# Patient Record
Sex: FEMALE | Race: WHITE | NOT HISPANIC OR LATINO | Employment: OTHER | RURAL
[De-identification: names, ages, dates, MRNs, and addresses within clinical notes are randomized per-mention and may not be internally consistent; named-entity substitution may affect disease eponyms.]

---

## 2020-09-08 ENCOUNTER — HISTORICAL (OUTPATIENT)
Dept: ADMINISTRATIVE | Facility: HOSPITAL | Age: 65
End: 2020-09-08

## 2020-11-05 ENCOUNTER — HISTORICAL (OUTPATIENT)
Dept: ADMINISTRATIVE | Facility: HOSPITAL | Age: 65
End: 2020-11-05

## 2020-11-23 ENCOUNTER — HISTORICAL (OUTPATIENT)
Dept: ADMINISTRATIVE | Facility: HOSPITAL | Age: 65
End: 2020-11-23

## 2020-12-17 ENCOUNTER — HISTORICAL (OUTPATIENT)
Dept: ADMINISTRATIVE | Facility: HOSPITAL | Age: 65
End: 2020-12-17

## 2021-06-30 ENCOUNTER — HOSPITAL ENCOUNTER (OUTPATIENT)
Dept: RADIOLOGY | Facility: HOSPITAL | Age: 66
Discharge: HOME | End: 2021-06-30
Payer: COMMERCIAL

## 2021-06-30 VITALS — HEIGHT: 64 IN | WEIGHT: 138 LBS | BODY MASS INDEX: 23.56 KG/M2

## 2021-06-30 DIAGNOSIS — Z12.31 VISIT FOR SCREENING MAMMOGRAM: ICD-10-CM

## 2021-06-30 PROCEDURE — 77067 SCR MAMMO BI INCL CAD: CPT | Mod: 26,,, | Performed by: RADIOLOGY

## 2021-06-30 PROCEDURE — 77067 MAMMO DIGITAL SCREENING BILAT: ICD-10-PCS | Mod: 26,,, | Performed by: RADIOLOGY

## 2021-06-30 PROCEDURE — 77067 SCR MAMMO BI INCL CAD: CPT | Mod: TC

## 2021-09-29 RX ORDER — ZOLPIDEM TARTRATE 10 MG/1
TABLET ORAL
Qty: 30 TABLET | Refills: 2 | Status: SHIPPED | OUTPATIENT
Start: 2021-09-29 | End: 2021-09-29 | Stop reason: SDUPTHER

## 2021-09-29 RX ORDER — ZOLPIDEM TARTRATE 10 MG/1
10 TABLET ORAL NIGHTLY PRN
Qty: 30 TABLET | Refills: 0 | Status: SHIPPED | OUTPATIENT
Start: 2021-09-29 | End: 2021-10-26 | Stop reason: SDUPTHER

## 2021-10-26 ENCOUNTER — OFFICE VISIT (OUTPATIENT)
Dept: FAMILY MEDICINE | Facility: CLINIC | Age: 66
End: 2021-10-26
Payer: COMMERCIAL

## 2021-10-26 ENCOUNTER — APPOINTMENT (OUTPATIENT)
Dept: RADIOLOGY | Facility: CLINIC | Age: 66
End: 2021-10-26
Attending: FAMILY MEDICINE
Payer: COMMERCIAL

## 2021-10-26 VITALS
OXYGEN SATURATION: 98 % | HEIGHT: 64 IN | SYSTOLIC BLOOD PRESSURE: 98 MMHG | BODY MASS INDEX: 23.7 KG/M2 | DIASTOLIC BLOOD PRESSURE: 53 MMHG | WEIGHT: 138.81 LBS | TEMPERATURE: 99 F | HEART RATE: 87 BPM

## 2021-10-26 DIAGNOSIS — M54.50 LOW BACK PAIN, UNSPECIFIED BACK PAIN LATERALITY, UNSPECIFIED CHRONICITY, UNSPECIFIED WHETHER SCIATICA PRESENT: ICD-10-CM

## 2021-10-26 DIAGNOSIS — F41.1 GAD (GENERALIZED ANXIETY DISORDER): ICD-10-CM

## 2021-10-26 DIAGNOSIS — E03.9 HYPOTHYROIDISM, UNSPECIFIED TYPE: ICD-10-CM

## 2021-10-26 DIAGNOSIS — I10 HYPERTENSION, UNSPECIFIED TYPE: ICD-10-CM

## 2021-10-26 DIAGNOSIS — M54.50 LOW BACK PAIN, UNSPECIFIED BACK PAIN LATERALITY, UNSPECIFIED CHRONICITY, UNSPECIFIED WHETHER SCIATICA PRESENT: Primary | ICD-10-CM

## 2021-10-26 LAB
ALBUMIN SERPL BCP-MCNC: 3.4 G/DL (ref 3.5–5)
ALBUMIN/GLOB SERPL: 0.8 {RATIO}
ALP SERPL-CCNC: 153 U/L (ref 50–130)
ALT SERPL W P-5'-P-CCNC: 20 U/L (ref 13–56)
ANION GAP SERPL CALCULATED.3IONS-SCNC: 11 MMOL/L (ref 7–16)
AST SERPL W P-5'-P-CCNC: 16 U/L (ref 15–37)
BASOPHILS # BLD AUTO: 0.07 K/UL (ref 0–0.2)
BASOPHILS NFR BLD AUTO: 0.6 % (ref 0–1)
BASOPHILS NFR BLD MANUAL: 1 % (ref 0–1)
BILIRUB SERPL-MCNC: 0.5 MG/DL (ref 0–1.2)
BUN SERPL-MCNC: 14 MG/DL (ref 7–18)
BUN/CREAT SERPL: 12 (ref 6–20)
CALCIUM SERPL-MCNC: 9.1 MG/DL (ref 8.5–10.1)
CHLORIDE SERPL-SCNC: 96 MMOL/L (ref 98–107)
CHOLEST SERPL-MCNC: 160 MG/DL (ref 0–200)
CHOLEST/HDLC SERPL: 2.1 {RATIO}
CO2 SERPL-SCNC: 31 MMOL/L (ref 21–32)
CREAT SERPL-MCNC: 1.21 MG/DL (ref 0.55–1.02)
DIFFERENTIAL METHOD BLD: ABNORMAL
EOSINOPHIL # BLD AUTO: 0.04 K/UL (ref 0–0.5)
EOSINOPHIL NFR BLD AUTO: 0.3 % (ref 1–4)
ERYTHROCYTE [DISTWIDTH] IN BLOOD BY AUTOMATED COUNT: 15.3 % (ref 11.5–14.5)
GLOBULIN SER-MCNC: 4.3 G/DL (ref 2–4)
GLUCOSE SERPL-MCNC: 80 MG/DL (ref 74–106)
HCT VFR BLD AUTO: 35.5 % (ref 38–47)
HDLC SERPL-MCNC: 77 MG/DL (ref 40–60)
HGB BLD-MCNC: 11.5 G/DL (ref 12–16)
IMM GRANULOCYTES # BLD AUTO: 0.04 K/UL (ref 0–0.04)
IMM GRANULOCYTES NFR BLD: 0.3 % (ref 0–0.4)
LDLC SERPL CALC-MCNC: 65 MG/DL
LDLC/HDLC SERPL: 0.8 {RATIO}
LYMPHOCYTES # BLD AUTO: 3.46 K/UL (ref 1–4.8)
LYMPHOCYTES NFR BLD AUTO: 29.7 % (ref 27–41)
LYMPHOCYTES NFR BLD MANUAL: 34 % (ref 27–41)
MCH RBC QN AUTO: 29.6 PG (ref 27–31)
MCHC RBC AUTO-ENTMCNC: 32.4 G/DL (ref 32–36)
MCV RBC AUTO: 91.5 FL (ref 80–96)
MONOCYTES # BLD AUTO: 1.42 K/UL (ref 0–0.8)
MONOCYTES NFR BLD AUTO: 12.2 % (ref 2–6)
MONOCYTES NFR BLD MANUAL: 16 % (ref 2–6)
MPC BLD CALC-MCNC: 10.4 FL (ref 9.4–12.4)
NEUTROPHILS # BLD AUTO: 6.61 K/UL (ref 1.8–7.7)
NEUTROPHILS NFR BLD AUTO: 56.9 % (ref 53–65)
NEUTS SEG NFR BLD MANUAL: 49 % (ref 50–62)
NONHDLC SERPL-MCNC: 83 MG/DL
NRBC # BLD AUTO: 0 X10E3/UL
NRBC, AUTO (.00): 0 %
PLATELET # BLD AUTO: 392 K/UL (ref 150–400)
PLATELET MORPHOLOGY: NORMAL
POTASSIUM SERPL-SCNC: 3.2 MMOL/L (ref 3.5–5.1)
PROT SERPL-MCNC: 7.7 G/DL (ref 6.4–8.2)
RBC # BLD AUTO: 3.88 M/UL (ref 4.2–5.4)
RBC MORPH BLD: NORMAL
SODIUM SERPL-SCNC: 135 MMOL/L (ref 136–145)
T4 FREE SERPL-MCNC: 0.93 NG/DL (ref 0.76–1.46)
TRIGL SERPL-MCNC: 91 MG/DL (ref 35–150)
TSH SERPL DL<=0.005 MIU/L-ACNC: 3.32 UIU/ML (ref 0.36–3.74)
VLDLC SERPL-MCNC: 18 MG/DL
WBC # BLD AUTO: 11.64 K/UL (ref 4.5–11)

## 2021-10-26 PROCEDURE — 72100 PR  X-RAY LUMBAR SPINE 2/3 VW: ICD-10-PCS | Mod: TC,,, | Performed by: FAMILY MEDICINE

## 2021-10-26 PROCEDURE — 80061 LIPID PANEL: ICD-10-PCS | Mod: ,,, | Performed by: CLINICAL MEDICAL LABORATORY

## 2021-10-26 PROCEDURE — 96372 THER/PROPH/DIAG INJ SC/IM: CPT | Mod: ,,, | Performed by: FAMILY MEDICINE

## 2021-10-26 PROCEDURE — 99214 PR OFFICE/OUTPT VISIT, EST, LEVL IV, 30-39 MIN: ICD-10-PCS | Mod: 25,,, | Performed by: FAMILY MEDICINE

## 2021-10-26 PROCEDURE — 84439 ASSAY OF FREE THYROXINE: CPT | Mod: ,,, | Performed by: CLINICAL MEDICAL LABORATORY

## 2021-10-26 PROCEDURE — 84439 T4, FREE: ICD-10-PCS | Mod: ,,, | Performed by: CLINICAL MEDICAL LABORATORY

## 2021-10-26 PROCEDURE — 84443 ASSAY THYROID STIM HORMONE: CPT | Mod: ,,, | Performed by: CLINICAL MEDICAL LABORATORY

## 2021-10-26 PROCEDURE — 85025 CBC WITH DIFFERENTIAL: ICD-10-PCS | Mod: ,,, | Performed by: CLINICAL MEDICAL LABORATORY

## 2021-10-26 PROCEDURE — 72100 X-RAY EXAM L-S SPINE 2/3 VWS: CPT | Mod: TC,,, | Performed by: FAMILY MEDICINE

## 2021-10-26 PROCEDURE — 72100 X-RAY EXAM L-S SPINE 2/3 VWS: CPT | Mod: 26,,, | Performed by: RADIOLOGY

## 2021-10-26 PROCEDURE — 84443 TSH: ICD-10-PCS | Mod: ,,, | Performed by: CLINICAL MEDICAL LABORATORY

## 2021-10-26 PROCEDURE — 85025 COMPLETE CBC W/AUTO DIFF WBC: CPT | Mod: ,,, | Performed by: CLINICAL MEDICAL LABORATORY

## 2021-10-26 PROCEDURE — 80053 COMPREHEN METABOLIC PANEL: CPT | Mod: ,,, | Performed by: CLINICAL MEDICAL LABORATORY

## 2021-10-26 PROCEDURE — 72100 X-RAY EXAM L-S SPINE 2/3 VWS: CPT | Mod: TC,RHCUB,FY | Performed by: FAMILY MEDICINE

## 2021-10-26 PROCEDURE — 80061 LIPID PANEL: CPT | Mod: ,,, | Performed by: CLINICAL MEDICAL LABORATORY

## 2021-10-26 PROCEDURE — 72100 XR LUMBAR SPINE AP AND LATERAL: ICD-10-PCS | Mod: 26,,, | Performed by: RADIOLOGY

## 2021-10-26 PROCEDURE — 96372 PR INJECTION,THERAP/PROPH/DIAG2ST, IM OR SUBCUT: ICD-10-PCS | Mod: ,,, | Performed by: FAMILY MEDICINE

## 2021-10-26 PROCEDURE — 80053 COMPREHENSIVE METABOLIC PANEL: ICD-10-PCS | Mod: ,,, | Performed by: CLINICAL MEDICAL LABORATORY

## 2021-10-26 PROCEDURE — 99214 OFFICE O/P EST MOD 30 MIN: CPT | Mod: 25,,, | Performed by: FAMILY MEDICINE

## 2021-10-26 RX ORDER — CLOPIDOGREL BISULFATE 75 MG/1
75 TABLET ORAL DAILY
COMMUNITY
Start: 2021-09-29 | End: 2022-12-21 | Stop reason: ALTCHOICE

## 2021-10-26 RX ORDER — KETOROLAC TROMETHAMINE 30 MG/ML
30 INJECTION, SOLUTION INTRAMUSCULAR; INTRAVENOUS
Status: COMPLETED | OUTPATIENT
Start: 2021-10-26 | End: 2021-10-26

## 2021-10-26 RX ORDER — NITROGLYCERIN 0.4 MG/1
0.4 TABLET SUBLINGUAL EVERY 5 MIN PRN
COMMUNITY

## 2021-10-26 RX ORDER — METHOCARBAMOL 750 MG/1
750 TABLET, FILM COATED ORAL 3 TIMES DAILY PRN
COMMUNITY
Start: 2021-09-29

## 2021-10-26 RX ORDER — PAROXETINE HYDROCHLORIDE 40 MG/1
40 TABLET, FILM COATED ORAL EVERY MORNING
COMMUNITY
Start: 2021-09-29 | End: 2021-10-26

## 2021-10-26 RX ORDER — BUSPIRONE HYDROCHLORIDE 10 MG/1
10 TABLET ORAL 2 TIMES DAILY
Qty: 60 TABLET | Refills: 11 | Status: SHIPPED | OUTPATIENT
Start: 2021-10-26 | End: 2022-10-24

## 2021-10-26 RX ORDER — AMITRIPTYLINE HYDROCHLORIDE 100 MG/1
100 TABLET ORAL NIGHTLY
COMMUNITY
Start: 2021-10-11 | End: 2023-04-06

## 2021-10-26 RX ORDER — PROMETHAZINE HYDROCHLORIDE 25 MG/1
TABLET ORAL
COMMUNITY
Start: 2021-10-11

## 2021-10-26 RX ORDER — ZOLPIDEM TARTRATE 10 MG/1
10 TABLET ORAL NIGHTLY PRN
Qty: 30 TABLET | Refills: 2 | Status: SHIPPED | OUTPATIENT
Start: 2021-10-26 | End: 2022-05-23 | Stop reason: SDUPTHER

## 2021-10-26 RX ORDER — ASPIRIN 81 MG/1
81 TABLET ORAL DAILY
COMMUNITY

## 2021-10-26 RX ORDER — POTASSIUM CHLORIDE 20 MEQ/1
1 TABLET, EXTENDED RELEASE ORAL 2 TIMES DAILY
COMMUNITY
Start: 2021-10-13

## 2021-10-26 RX ORDER — LEVOTHYROXINE SODIUM 50 UG/1
50 TABLET ORAL EVERY MORNING
COMMUNITY
Start: 2021-09-29 | End: 2021-10-26 | Stop reason: SDUPTHER

## 2021-10-26 RX ORDER — MULTIVITAMIN
1 TABLET ORAL DAILY
COMMUNITY

## 2021-10-26 RX ORDER — LANOLIN ALCOHOL/MO/W.PET/CERES
1 CREAM (GRAM) TOPICAL 2 TIMES DAILY
COMMUNITY
End: 2022-12-08 | Stop reason: SDUPTHER

## 2021-10-26 RX ORDER — DULOXETIN HYDROCHLORIDE 60 MG/1
60 CAPSULE, DELAYED RELEASE ORAL 2 TIMES DAILY
COMMUNITY
Start: 2021-10-21 | End: 2023-04-06

## 2021-10-26 RX ORDER — GABAPENTIN 300 MG/1
300 CAPSULE ORAL 3 TIMES DAILY
COMMUNITY
Start: 2021-10-18

## 2021-10-26 RX ORDER — ISOSORBIDE MONONITRATE 60 MG/1
60 TABLET, EXTENDED RELEASE ORAL 2 TIMES DAILY
COMMUNITY
Start: 2021-09-29

## 2021-10-26 RX ORDER — TOPIRAMATE 100 MG/1
1000 TABLET, FILM COATED ORAL NIGHTLY
COMMUNITY
Start: 2021-10-11

## 2021-10-26 RX ORDER — METOLAZONE 2.5 MG/1
2.5 TABLET ORAL DAILY PRN
COMMUNITY

## 2021-10-26 RX ORDER — LEVOTHYROXINE SODIUM 50 UG/1
50 TABLET ORAL EVERY MORNING
Qty: 90 TABLET | Refills: 1 | Status: SHIPPED | OUTPATIENT
Start: 2021-10-26 | End: 2022-05-23 | Stop reason: SDUPTHER

## 2021-10-26 RX ORDER — HYDROCODONE BITARTRATE AND ACETAMINOPHEN 10; 325 MG/1; MG/1
TABLET ORAL
COMMUNITY
Start: 2021-09-29

## 2021-10-26 RX ORDER — FUROSEMIDE 80 MG/1
80 TABLET ORAL 2 TIMES DAILY
COMMUNITY
Start: 2021-10-15

## 2021-10-26 RX ORDER — DILTIAZEM HYDROCHLORIDE 120 MG/1
CAPSULE, COATED, EXTENDED RELEASE ORAL
COMMUNITY
Start: 2021-10-15 | End: 2023-04-06

## 2021-10-26 RX ORDER — BUTALBITAL AND ACETAMINOPHEN 325; 50 MG/1; MG/1
TABLET ORAL
COMMUNITY
Start: 2021-09-29

## 2021-10-26 RX ORDER — HYDRALAZINE HYDROCHLORIDE 10 MG/1
10 TABLET, FILM COATED ORAL 3 TIMES DAILY
COMMUNITY
Start: 2021-09-17 | End: 2023-04-06

## 2021-10-26 RX ADMIN — KETOROLAC TROMETHAMINE 30 MG: 30 INJECTION, SOLUTION INTRAMUSCULAR; INTRAVENOUS at 10:10

## 2021-11-04 ENCOUNTER — PATIENT OUTREACH (OUTPATIENT)
Dept: FAMILY MEDICINE | Facility: CLINIC | Age: 66
End: 2021-11-04
Payer: COMMERCIAL

## 2021-11-18 ENCOUNTER — OFFICE VISIT (OUTPATIENT)
Dept: FAMILY MEDICINE | Facility: CLINIC | Age: 66
End: 2021-11-18
Payer: COMMERCIAL

## 2021-11-18 VITALS
SYSTOLIC BLOOD PRESSURE: 109 MMHG | TEMPERATURE: 97 F | BODY MASS INDEX: 23.56 KG/M2 | HEART RATE: 97 BPM | OXYGEN SATURATION: 98 % | DIASTOLIC BLOOD PRESSURE: 56 MMHG | HEIGHT: 64 IN | WEIGHT: 138 LBS

## 2021-11-18 DIAGNOSIS — J01.00 ACUTE NON-RECURRENT MAXILLARY SINUSITIS: Primary | ICD-10-CM

## 2021-11-18 DIAGNOSIS — R05.9 COUGH: ICD-10-CM

## 2021-11-18 PROCEDURE — 99213 PR OFFICE/OUTPT VISIT, EST, LEVL III, 20-29 MIN: ICD-10-PCS | Mod: 25,,, | Performed by: FAMILY MEDICINE

## 2021-11-18 PROCEDURE — 96372 THER/PROPH/DIAG INJ SC/IM: CPT | Mod: ,,, | Performed by: FAMILY MEDICINE

## 2021-11-18 PROCEDURE — 96372 PR INJECTION,THERAP/PROPH/DIAG2ST, IM OR SUBCUT: ICD-10-PCS | Mod: ,,, | Performed by: FAMILY MEDICINE

## 2021-11-18 PROCEDURE — 99213 OFFICE O/P EST LOW 20 MIN: CPT | Mod: 25,,, | Performed by: FAMILY MEDICINE

## 2021-11-18 RX ORDER — CEFTRIAXONE 1 G/1
1 INJECTION, POWDER, FOR SOLUTION INTRAMUSCULAR; INTRAVENOUS
Status: COMPLETED | OUTPATIENT
Start: 2021-11-18 | End: 2021-11-18

## 2021-11-18 RX ORDER — PAROXETINE HYDROCHLORIDE 40 MG/1
40 TABLET, FILM COATED ORAL EVERY MORNING
COMMUNITY
Start: 2021-11-04 | End: 2022-01-05 | Stop reason: SDUPTHER

## 2021-11-18 RX ORDER — DEXAMETHASONE SODIUM PHOSPHATE 4 MG/ML
2 INJECTION, SOLUTION INTRA-ARTICULAR; INTRALESIONAL; INTRAMUSCULAR; INTRAVENOUS; SOFT TISSUE
Status: COMPLETED | OUTPATIENT
Start: 2021-11-18 | End: 2021-11-18

## 2021-11-18 RX ORDER — METHYLPREDNISOLONE ACETATE 80 MG/ML
20 INJECTION, SUSPENSION INTRA-ARTICULAR; INTRALESIONAL; INTRAMUSCULAR; SOFT TISSUE
Status: COMPLETED | OUTPATIENT
Start: 2021-11-18 | End: 2021-11-18

## 2021-11-18 RX ADMIN — DEXAMETHASONE SODIUM PHOSPHATE 2 MG: 4 INJECTION, SOLUTION INTRA-ARTICULAR; INTRALESIONAL; INTRAMUSCULAR; INTRAVENOUS; SOFT TISSUE at 03:11

## 2021-11-18 RX ADMIN — METHYLPREDNISOLONE ACETATE 20 MG: 80 INJECTION, SUSPENSION INTRA-ARTICULAR; INTRALESIONAL; INTRAMUSCULAR; SOFT TISSUE at 03:11

## 2021-11-18 RX ADMIN — CEFTRIAXONE 1 G: 1 INJECTION, POWDER, FOR SOLUTION INTRAMUSCULAR; INTRAVENOUS at 03:11

## 2022-01-05 RX ORDER — PAROXETINE HYDROCHLORIDE 40 MG/1
40 TABLET, FILM COATED ORAL EVERY MORNING
Qty: 90 TABLET | Refills: 1 | Status: SHIPPED | OUTPATIENT
Start: 2022-01-05 | End: 2022-06-07 | Stop reason: SDUPTHER

## 2022-01-05 NOTE — TELEPHONE ENCOUNTER
----- Message from Denise Valdez sent at 1/5/2022  3:17 PM CST -----  Contact: self  Requesting Paxil to [facility]  [phone]

## 2022-05-23 RX ORDER — LEVOTHYROXINE SODIUM 50 UG/1
50 TABLET ORAL EVERY MORNING
Qty: 30 TABLET | Refills: 0 | Status: SHIPPED | OUTPATIENT
Start: 2022-05-23 | End: 2022-06-07 | Stop reason: SDUPTHER

## 2022-05-23 RX ORDER — ZOLPIDEM TARTRATE 10 MG/1
10 TABLET ORAL NIGHTLY PRN
Qty: 30 TABLET | Refills: 0 | Status: SHIPPED | OUTPATIENT
Start: 2022-05-23 | End: 2022-06-07 | Stop reason: SDUPTHER

## 2022-05-23 NOTE — TELEPHONE ENCOUNTER
To schedule ck up  ----- Message from Pat Blake sent at 5/23/2022  2:25 PM CDT -----  Pt is requesting refills on Ambien and Levothyroxine to be sent to [facility]

## 2022-06-07 ENCOUNTER — OFFICE VISIT (OUTPATIENT)
Dept: FAMILY MEDICINE | Facility: CLINIC | Age: 67
End: 2022-06-07
Payer: MEDICARE

## 2022-06-07 VITALS
TEMPERATURE: 98 F | HEART RATE: 84 BPM | BODY MASS INDEX: 23.05 KG/M2 | SYSTOLIC BLOOD PRESSURE: 99 MMHG | OXYGEN SATURATION: 96 % | HEIGHT: 64 IN | DIASTOLIC BLOOD PRESSURE: 55 MMHG | WEIGHT: 135 LBS

## 2022-06-07 DIAGNOSIS — E03.9 HYPOTHYROIDISM, UNSPECIFIED TYPE: Primary | ICD-10-CM

## 2022-06-07 DIAGNOSIS — J01.01 ACUTE RECURRENT MAXILLARY SINUSITIS: ICD-10-CM

## 2022-06-07 LAB
ATYPICAL LYMPHOCYTES: ABNORMAL
BASOPHILS # BLD AUTO: 0.05 K/UL (ref 0–0.2)
BASOPHILS NFR BLD AUTO: 0.7 % (ref 0–1)
BASOPHILS NFR BLD MANUAL: 1 % (ref 0–1)
DIFFERENTIAL METHOD BLD: ABNORMAL
EOSINOPHIL # BLD AUTO: 0.15 K/UL (ref 0–0.5)
EOSINOPHIL NFR BLD AUTO: 2 % (ref 1–4)
EOSINOPHIL NFR BLD MANUAL: 4 % (ref 1–4)
ERYTHROCYTE [DISTWIDTH] IN BLOOD BY AUTOMATED COUNT: 13.5 % (ref 11.5–14.5)
HCT VFR BLD AUTO: 37.2 % (ref 38–47)
HGB BLD-MCNC: 12.4 G/DL (ref 12–16)
IMM GRANULOCYTES # BLD AUTO: 0.07 K/UL (ref 0–0.04)
IMM GRANULOCYTES NFR BLD: 0.9 % (ref 0–0.4)
LYMPHOCYTES # BLD AUTO: 2.11 K/UL (ref 1–4.8)
LYMPHOCYTES NFR BLD AUTO: 27.7 % (ref 27–41)
LYMPHOCYTES NFR BLD MANUAL: 34 % (ref 27–41)
MCH RBC QN AUTO: 31.1 PG (ref 27–31)
MCHC RBC AUTO-ENTMCNC: 33.3 G/DL (ref 32–36)
MCV RBC AUTO: 93.2 FL (ref 80–96)
MONOCYTES # BLD AUTO: 0.86 K/UL (ref 0–0.8)
MONOCYTES NFR BLD AUTO: 11.3 % (ref 2–6)
MONOCYTES NFR BLD MANUAL: 7 % (ref 2–6)
MPC BLD CALC-MCNC: 10.4 FL (ref 9.4–12.4)
NEUTROPHILS # BLD AUTO: 4.39 K/UL (ref 1.8–7.7)
NEUTROPHILS NFR BLD AUTO: 57.4 % (ref 53–65)
NEUTS BAND NFR BLD MANUAL: 1 % (ref 1–5)
NEUTS SEG NFR BLD MANUAL: 53 % (ref 50–62)
NRBC # BLD AUTO: 0 X10E3/UL
NRBC, AUTO (.00): 0 %
PLATELET # BLD AUTO: 389 K/UL (ref 150–400)
PLATELET MORPHOLOGY: NORMAL
RBC # BLD AUTO: 3.99 M/UL (ref 4.2–5.4)
RBC MORPH BLD: NORMAL
SMUDGE CELLS BLD QL SMEAR: ABNORMAL
WBC # BLD AUTO: 7.63 K/UL (ref 4.5–11)
WBC TOXIC VACUOLES BLD QL SMEAR: ABNORMAL

## 2022-06-07 PROCEDURE — 99214 OFFICE O/P EST MOD 30 MIN: CPT | Mod: 25,,, | Performed by: FAMILY MEDICINE

## 2022-06-07 PROCEDURE — 3078F PR MOST RECENT DIASTOLIC BLOOD PRESSURE < 80 MM HG: ICD-10-PCS | Mod: ,,, | Performed by: FAMILY MEDICINE

## 2022-06-07 PROCEDURE — 3288F FALL RISK ASSESSMENT DOCD: CPT | Mod: ,,, | Performed by: FAMILY MEDICINE

## 2022-06-07 PROCEDURE — 3074F PR MOST RECENT SYSTOLIC BLOOD PRESSURE < 130 MM HG: ICD-10-PCS | Mod: ,,, | Performed by: FAMILY MEDICINE

## 2022-06-07 PROCEDURE — 3008F PR BODY MASS INDEX (BMI) DOCUMENTED: ICD-10-PCS | Mod: ,,, | Performed by: FAMILY MEDICINE

## 2022-06-07 PROCEDURE — 1101F PR PT FALLS ASSESS DOC 0-1 FALLS W/OUT INJ PAST YR: ICD-10-PCS | Mod: ,,, | Performed by: FAMILY MEDICINE

## 2022-06-07 PROCEDURE — 84443 TSH: ICD-10-PCS | Mod: ,,, | Performed by: CLINICAL MEDICAL LABORATORY

## 2022-06-07 PROCEDURE — 3288F PR FALLS RISK ASSESSMENT DOCUMENTED: ICD-10-PCS | Mod: ,,, | Performed by: FAMILY MEDICINE

## 2022-06-07 PROCEDURE — 99214 PR OFFICE/OUTPT VISIT, EST, LEVL IV, 30-39 MIN: ICD-10-PCS | Mod: 25,,, | Performed by: FAMILY MEDICINE

## 2022-06-07 PROCEDURE — 96372 THER/PROPH/DIAG INJ SC/IM: CPT | Mod: ,,, | Performed by: FAMILY MEDICINE

## 2022-06-07 PROCEDURE — 80053 COMPREHEN METABOLIC PANEL: CPT | Mod: ,,, | Performed by: CLINICAL MEDICAL LABORATORY

## 2022-06-07 PROCEDURE — 84439 ASSAY OF FREE THYROXINE: CPT | Mod: ,,, | Performed by: CLINICAL MEDICAL LABORATORY

## 2022-06-07 PROCEDURE — 84443 ASSAY THYROID STIM HORMONE: CPT | Mod: ,,, | Performed by: CLINICAL MEDICAL LABORATORY

## 2022-06-07 PROCEDURE — 96372 PR INJECTION,THERAP/PROPH/DIAG2ST, IM OR SUBCUT: ICD-10-PCS | Mod: ,,, | Performed by: FAMILY MEDICINE

## 2022-06-07 PROCEDURE — 80053 COMPREHENSIVE METABOLIC PANEL: ICD-10-PCS | Mod: ,,, | Performed by: CLINICAL MEDICAL LABORATORY

## 2022-06-07 PROCEDURE — 84439 T4, FREE: ICD-10-PCS | Mod: ,,, | Performed by: CLINICAL MEDICAL LABORATORY

## 2022-06-07 PROCEDURE — 3074F SYST BP LT 130 MM HG: CPT | Mod: ,,, | Performed by: FAMILY MEDICINE

## 2022-06-07 PROCEDURE — 3078F DIAST BP <80 MM HG: CPT | Mod: ,,, | Performed by: FAMILY MEDICINE

## 2022-06-07 PROCEDURE — 85025 COMPLETE CBC W/AUTO DIFF WBC: CPT | Mod: ,,, | Performed by: CLINICAL MEDICAL LABORATORY

## 2022-06-07 PROCEDURE — 3008F BODY MASS INDEX DOCD: CPT | Mod: ,,, | Performed by: FAMILY MEDICINE

## 2022-06-07 PROCEDURE — 1101F PT FALLS ASSESS-DOCD LE1/YR: CPT | Mod: ,,, | Performed by: FAMILY MEDICINE

## 2022-06-07 PROCEDURE — 85025 CBC WITH DIFFERENTIAL: ICD-10-PCS | Mod: ,,, | Performed by: CLINICAL MEDICAL LABORATORY

## 2022-06-07 PROCEDURE — 1159F MED LIST DOCD IN RCRD: CPT | Mod: ,,, | Performed by: FAMILY MEDICINE

## 2022-06-07 PROCEDURE — 1159F PR MEDICATION LIST DOCUMENTED IN MEDICAL RECORD: ICD-10-PCS | Mod: ,,, | Performed by: FAMILY MEDICINE

## 2022-06-07 RX ORDER — PAROXETINE HYDROCHLORIDE 40 MG/1
40 TABLET, FILM COATED ORAL EVERY MORNING
Qty: 90 TABLET | Refills: 1 | Status: SHIPPED | OUTPATIENT
Start: 2022-06-07 | End: 2022-07-06

## 2022-06-07 RX ORDER — CEFTRIAXONE 1 G/1
1 INJECTION, POWDER, FOR SOLUTION INTRAMUSCULAR; INTRAVENOUS
Status: COMPLETED | OUTPATIENT
Start: 2022-06-07 | End: 2022-06-07

## 2022-06-07 RX ORDER — METHYLPREDNISOLONE ACETATE 80 MG/ML
20 INJECTION, SUSPENSION INTRA-ARTICULAR; INTRALESIONAL; INTRAMUSCULAR; SOFT TISSUE
Status: COMPLETED | OUTPATIENT
Start: 2022-06-07 | End: 2022-06-07

## 2022-06-07 RX ORDER — ZOLPIDEM TARTRATE 10 MG/1
10 TABLET ORAL NIGHTLY PRN
Qty: 30 TABLET | Refills: 5 | Status: SHIPPED | OUTPATIENT
Start: 2022-06-07 | End: 2022-07-06

## 2022-06-07 RX ORDER — DEXAMETHASONE SODIUM PHOSPHATE 4 MG/ML
2 INJECTION, SOLUTION INTRA-ARTICULAR; INTRALESIONAL; INTRAMUSCULAR; INTRAVENOUS; SOFT TISSUE
Status: COMPLETED | OUTPATIENT
Start: 2022-06-07 | End: 2022-06-07

## 2022-06-07 RX ORDER — AZITHROMYCIN 250 MG/1
TABLET, FILM COATED ORAL
Qty: 6 TABLET | Refills: 0 | Status: SHIPPED | OUTPATIENT
Start: 2022-06-07 | End: 2022-06-12

## 2022-06-07 RX ORDER — LEVOTHYROXINE SODIUM 50 UG/1
50 TABLET ORAL EVERY MORNING
Qty: 90 TABLET | Refills: 1 | Status: SHIPPED | OUTPATIENT
Start: 2022-06-07 | End: 2022-07-06

## 2022-06-07 RX ADMIN — METHYLPREDNISOLONE ACETATE 20 MG: 80 INJECTION, SUSPENSION INTRA-ARTICULAR; INTRALESIONAL; INTRAMUSCULAR; SOFT TISSUE at 11:06

## 2022-06-07 RX ADMIN — CEFTRIAXONE 1 G: 1 INJECTION, POWDER, FOR SOLUTION INTRAMUSCULAR; INTRAVENOUS at 11:06

## 2022-06-07 RX ADMIN — DEXAMETHASONE SODIUM PHOSPHATE 2 MG: 4 INJECTION, SOLUTION INTRA-ARTICULAR; INTRALESIONAL; INTRAMUSCULAR; INTRAVENOUS; SOFT TISSUE at 11:06

## 2022-06-07 NOTE — PROGRESS NOTES
Vicente Guzmán MD   [facility]  [address] [place] [address]     PATIENT NAME: Anabelle Gaspar  : 1955  DATE: 22  MRN: [id number]      Billing Provider: Vicente Guzmán MD  Level of Service:   Patient PCP Information     Provider PCP Type    Vicente Guzmán MD General          Reason for Visit / Chief Complaint: Hypothyroidism and Nasal Congestion         History of Present Illness / Problem Focused Workflow     Anabelle Gaspar presents to the clinic with Hypothyroidism and Nasal Congestion     HPI    Review of Systems     Review of Systems   Constitutional: Negative for activity change, appetite change, fatigue and fever.   HENT: Positive for nasal congestion, sinus pressure/congestion and sore throat. Negative for ear pain and hearing loss.    Respiratory: Positive for cough. Negative for chest tightness and shortness of breath.    Cardiovascular: Negative for chest pain and palpitations.   Gastrointestinal: Negative for abdominal pain and fecal incontinence.   Genitourinary: Negative for bladder incontinence and difficulty urinating.   Musculoskeletal: Negative for arthralgias.   Integumentary:  Negative for rash.   Neurological: Negative for dizziness and headaches.        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     HYSTERECTOMY      NECK SURGERY      SINUS SURGERY         Social History  Anabelle Gaspar  reports that she has been smoking. She has never used smokeless tobacco. She reports previous alcohol use.    Family History  Anabelle Gaspar  family history includes Cancer in her sister; Diabetes in her mother and sister; Heart disease in her mother; Hyperlipidemia in her mother and sister;  Hypertension in her mother and sister.    Medications and Allergies     Medications  Outpatient Medications Marked as Taking for the 6/7/22 encounter (Office Visit) with Vicente Guzmán MD   Medication Sig Dispense Refill    amitriptyline (ELAVIL) 100 MG tablet Take 100 mg by mouth nightly.      aspirin (ECOTRIN) 81 MG EC tablet Take 81 mg by mouth once daily.      butalbitaL-acetaminophen  mg Tab TAKE 1 OR 2 TABLETS BY MOUTH EVERY 4 HOURS AS NEEDED FOR PAIN      CALCIUM CITRATE ORAL Take 1 tablet by mouth once daily.      clopidogreL (PLAVIX) 75 mg tablet Take 75 mg by mouth once daily.      diltiaZEM (CARDIZEM CD) 120 MG Cp24 TAKE 1 CAPSULE BY MOUTH EVERYDAY FOR BLOOD PRESSURE.      DULoxetine (CYMBALTA) 60 MG capsule Take 60 mg by mouth 2 (two) times daily.      ergocalciferol, vitamin D2, (VITAMIN D ORAL) Take 1 tablet by mouth once daily.      ferrous sulfate (FEOSOL) Tab tablet Take 1 tablet by mouth 2 (two) times daily.      furosemide (LASIX) 80 MG tablet TAKE 1 TABLET BY MOUTH 2 TIMES A DAY FOR FLUID AND BLOOD PRESSURE.      gabapentin (NEURONTIN) 300 MG capsule Take 300 mg by mouth 3 (three) times daily.      hydrALAZINE (APRESOLINE) 10 MG tablet Take 10 mg by mouth 3 (three) times daily.      HYDROcodone-acetaminophen (NORCO)  mg per tablet TAKE 1 TABLET BY MOUTH 4 TIMES A DAY IF NEEDED FOR PAIN.      isosorbide mononitrate (IMDUR) 60 MG 24 hr tablet Take 60 mg by mouth 2 (two) times daily.      KRILL OIL ORAL Take 1 tablet by mouth once daily.      methocarbamoL (ROBAXIN) 750 MG Tab Take 750 mg by mouth 3 (three) times daily as needed.      metOLazone (ZAROXOLYN) 2.5 MG tablet Take 2.5 mg by mouth daily as needed. Take as needed for a weight gain of 3+      multivitamin (THERAGRAN) per tablet Take 1 tablet by mouth once daily.      nitroGLYCERIN (NITROSTAT) 0.4 MG SL tablet Place 0.4 mg under the tongue every 5 (five) minutes as needed for Chest pain.      potassium  chloride SA (K-DUR,KLOR-CON) 20 MEQ tablet Take 1 tablet by mouth 2 (two) times a day.      promethazine (PHENERGAN) 25 MG tablet TAKE 1 TABLET BY MOUTH EVERYDAY IF NEEDED      topiramate (TOPAMAX) 200 MG Tab Take 200 mg by mouth nightly.      [DISCONTINUED] levothyroxine (SYNTHROID) 50 MCG tablet Take 1 tablet (50 mcg total) by mouth every morning. 30 tablet 0    [DISCONTINUED] paroxetine (PAXIL) 40 MG tablet Take 1 tablet (40 mg total) by mouth every morning. 90 tablet 1    [DISCONTINUED] zolpidem (AMBIEN) 10 mg Tab Take 1 tablet (10 mg total) by mouth nightly as needed (sleep). 30 tablet 0     Current Facility-Administered Medications for the 6/7/22 encounter (Office Visit) with Vicente Guzmán MD   Medication Dose Route Frequency Provider Last Rate Last Admin    cefTRIAXone injection 1 g  1 g Intramuscular 1 time in Clinic/HOD Vicente Guzmán MD        dexamethasone injection 2 mg  2 mg Intramuscular 1 time in Clinic/HOD Vicente Guzmán MD        methylPREDNISolone acetate injection 20 mg  20 mg Intramuscular 1 time in Clinic/HOD Vicente Guzmán MD           Allergies  Review of patient's allergies indicates:   Allergen Reactions    Levaquin [levofloxacin] Swelling    Dexamethasone Edema and Other (See Comments)    Prednisone Other (See Comments)       Physical Examination     Vitals:    06/07/22 1052   BP: (!) 99/55   Pulse: 84   Temp: 97.9 °F (36.6 °C)     Physical Exam  Constitutional:       Appearance: Normal appearance.   HENT:      Head: Normocephalic and atraumatic.      Right Ear: Tympanic membrane normal.      Left Ear: Tympanic membrane normal.      Nose: Congestion and rhinorrhea present.      Mouth/Throat:      Pharynx: Posterior oropharyngeal erythema present.   Eyes:      Pupils: Pupils are equal, round, and reactive to light.   Cardiovascular:      Rate and Rhythm: Normal rate and regular rhythm.      Pulses: Normal pulses.      Heart sounds: Normal heart sounds.    Pulmonary:      Breath sounds: No wheezing or rhonchi.   Abdominal:      Palpations: Abdomen is soft.   Lymphadenopathy:      Cervical: Cervical adenopathy present.   Skin:     General: Skin is warm and dry.   Neurological:      Mental Status: She is alert.          Assessment and Plan (including Health Maintenance)   :    Plan:         Health Maintenance Due   Topic Date Due    Hepatitis C Screening  Never done    Pneumococcal Vaccines (Age 65+) (1 - PCV) Never done    HIV Screening  Never done    TETANUS VACCINE  Never done    DEXA Scan  Never done    Shingles Vaccine (1 of 2) Never done    Pap Smear  01/29/2017    COVID-19 Vaccine (2 - Moderna series) 09/14/2021    Mammogram  06/30/2022       Problem List Items Addressed This Visit    None     Visit Diagnoses     Hypothyroidism, unspecified type    -  Primary    Relevant Orders    TSH    T4, Free    Comprehensive Metabolic Panel    CBC Auto Differential    Acute recurrent maxillary sinusitis        Relevant Medications    dexamethasone injection 2 mg (Start on 6/7/2022 11:15 AM)    methylPREDNISolone acetate injection 20 mg (Start on 6/7/2022 11:15 AM)    cefTRIAXone injection 1 g (Start on 6/7/2022 11:15 AM)        Hypothyroidism, unspecified type  -     TSH; Future; Expected date: 06/07/2022  -     T4, Free; Future; Expected date: 06/07/2022  -     Comprehensive Metabolic Panel; Future; Expected date: 06/07/2022  -     CBC Auto Differential; Future; Expected date: 06/07/2022    Acute recurrent maxillary sinusitis  -     dexamethasone injection 2 mg  -     methylPREDNISolone acetate injection 20 mg  -     cefTRIAXone injection 1 g    Other orders  -     zolpidem (AMBIEN) 10 mg Tab; Take 1 tablet (10 mg total) by mouth nightly as needed (sleep).  Dispense: 30 tablet; Refill: 5  -     paroxetine (PAXIL) 40 MG tablet; Take 1 tablet (40 mg total) by mouth every morning.  Dispense: 90 tablet; Refill: 1  -     levothyroxine (SYNTHROID) 50 MCG tablet; Take 1  tablet (50 mcg total) by mouth every morning.  Dispense: 90 tablet; Refill: 1  -     azithromycin (Z-ELINOR) 250 MG tablet; Take 2 tablets by mouth on day 1; Take 1 tablet by mouth on days 2-5  Dispense: 6 tablet; Refill: 0       Health Maintenance Topics with due status: Not Due       Topic Last Completion Date    Colorectal Cancer Screening 07/03/2019    Lipid Panel 10/26/2021    Influenza Vaccine Not Due       Procedures     Future Appointments   Date Time Provider Department Center   7/28/2022  2:40 PM [facility]        No follow-ups on file.       Signature:  Vicente Guzmán MD  [facility]  [address] [place] [address]  [phone] Phone  [phone] Fax    Date of encounter: 6/7/22

## 2022-06-08 LAB
ALBUMIN SERPL BCP-MCNC: 3.5 G/DL (ref 3.5–5)
ALBUMIN/GLOB SERPL: 0.9 {RATIO}
ALP SERPL-CCNC: 152 U/L (ref 55–142)
ALT SERPL W P-5'-P-CCNC: 17 U/L (ref 13–56)
ANION GAP SERPL CALCULATED.3IONS-SCNC: 9 MMOL/L (ref 7–16)
AST SERPL W P-5'-P-CCNC: 15 U/L (ref 15–37)
BILIRUB SERPL-MCNC: 0.2 MG/DL (ref 0–1.2)
BUN SERPL-MCNC: 10 MG/DL (ref 7–18)
BUN/CREAT SERPL: 9 (ref 6–20)
CALCIUM SERPL-MCNC: 9.3 MG/DL (ref 8.5–10.1)
CHLORIDE SERPL-SCNC: 109 MMOL/L (ref 98–107)
CO2 SERPL-SCNC: 28 MMOL/L (ref 21–32)
CREAT SERPL-MCNC: 1.12 MG/DL (ref 0.55–1.02)
GLOBULIN SER-MCNC: 3.9 G/DL (ref 2–4)
GLUCOSE SERPL-MCNC: 73 MG/DL (ref 74–106)
POTASSIUM SERPL-SCNC: 4 MMOL/L (ref 3.5–5.1)
PROT SERPL-MCNC: 7.4 G/DL (ref 6.4–8.2)
SODIUM SERPL-SCNC: 142 MMOL/L (ref 136–145)
T4 FREE SERPL-MCNC: 0.95 NG/DL (ref 0.76–1.46)
TSH SERPL DL<=0.005 MIU/L-ACNC: 2.46 UIU/ML (ref 0.36–3.74)

## 2022-06-26 PROBLEM — E03.9 HYPOTHYROIDISM: Status: ACTIVE | Noted: 2022-06-26

## 2022-07-28 ENCOUNTER — HOSPITAL ENCOUNTER (OUTPATIENT)
Dept: RADIOLOGY | Facility: HOSPITAL | Age: 67
Discharge: HOME | End: 2022-07-28
Payer: MEDICARE

## 2022-07-28 DIAGNOSIS — Z12.31 VISIT FOR SCREENING MAMMOGRAM: ICD-10-CM

## 2022-07-28 PROCEDURE — 77067 SCR MAMMO BI INCL CAD: CPT | Mod: 26,,, | Performed by: RADIOLOGY

## 2022-07-28 PROCEDURE — 77067 MAMMO DIGITAL SCREENING BILAT: ICD-10-PCS | Mod: 26,,, | Performed by: RADIOLOGY

## 2022-07-28 PROCEDURE — 77067 SCR MAMMO BI INCL CAD: CPT | Mod: TC

## 2022-10-24 ENCOUNTER — OFFICE VISIT (OUTPATIENT)
Dept: FAMILY MEDICINE | Facility: CLINIC | Age: 67
End: 2022-10-24
Payer: COMMERCIAL

## 2022-10-24 VITALS
BODY MASS INDEX: 22.2 KG/M2 | DIASTOLIC BLOOD PRESSURE: 58 MMHG | WEIGHT: 130 LBS | SYSTOLIC BLOOD PRESSURE: 102 MMHG | HEART RATE: 89 BPM | TEMPERATURE: 99 F | HEIGHT: 64 IN | OXYGEN SATURATION: 98 %

## 2022-10-24 DIAGNOSIS — J01.00 ACUTE NON-RECURRENT MAXILLARY SINUSITIS: Primary | ICD-10-CM

## 2022-10-24 DIAGNOSIS — E53.8 B12 DEFICIENCY: ICD-10-CM

## 2022-10-24 PROCEDURE — 99213 PR OFFICE/OUTPT VISIT, EST, LEVL III, 20-29 MIN: ICD-10-PCS | Mod: 25,,, | Performed by: FAMILY MEDICINE

## 2022-10-24 PROCEDURE — 3074F PR MOST RECENT SYSTOLIC BLOOD PRESSURE < 130 MM HG: ICD-10-PCS | Mod: CPTII,,, | Performed by: FAMILY MEDICINE

## 2022-10-24 PROCEDURE — 96372 THER/PROPH/DIAG INJ SC/IM: CPT | Mod: ,,, | Performed by: FAMILY MEDICINE

## 2022-10-24 PROCEDURE — 99213 OFFICE O/P EST LOW 20 MIN: CPT | Mod: 25,,, | Performed by: FAMILY MEDICINE

## 2022-10-24 PROCEDURE — 1159F PR MEDICATION LIST DOCUMENTED IN MEDICAL RECORD: ICD-10-PCS | Mod: CPTII,,, | Performed by: FAMILY MEDICINE

## 2022-10-24 PROCEDURE — 1159F MED LIST DOCD IN RCRD: CPT | Mod: CPTII,,, | Performed by: FAMILY MEDICINE

## 2022-10-24 PROCEDURE — 96372 PR INJECTION,THERAP/PROPH/DIAG2ST, IM OR SUBCUT: ICD-10-PCS | Mod: ,,, | Performed by: FAMILY MEDICINE

## 2022-10-24 PROCEDURE — 3078F PR MOST RECENT DIASTOLIC BLOOD PRESSURE < 80 MM HG: ICD-10-PCS | Mod: CPTII,,, | Performed by: FAMILY MEDICINE

## 2022-10-24 PROCEDURE — 3078F DIAST BP <80 MM HG: CPT | Mod: CPTII,,, | Performed by: FAMILY MEDICINE

## 2022-10-24 PROCEDURE — 3074F SYST BP LT 130 MM HG: CPT | Mod: CPTII,,, | Performed by: FAMILY MEDICINE

## 2022-10-24 RX ORDER — CEFTRIAXONE 1 G/1
1 INJECTION, POWDER, FOR SOLUTION INTRAMUSCULAR; INTRAVENOUS
Status: COMPLETED | OUTPATIENT
Start: 2022-10-24 | End: 2022-10-24

## 2022-10-24 RX ORDER — METHYLPREDNISOLONE ACETATE 80 MG/ML
40 INJECTION, SUSPENSION INTRA-ARTICULAR; INTRALESIONAL; INTRAMUSCULAR; SOFT TISSUE
Status: COMPLETED | OUTPATIENT
Start: 2022-10-24 | End: 2022-10-24

## 2022-10-24 RX ORDER — AZITHROMYCIN 250 MG/1
TABLET, FILM COATED ORAL
Qty: 6 TABLET | Refills: 0 | Status: ON HOLD | OUTPATIENT
Start: 2022-10-24 | End: 2022-11-02

## 2022-10-24 RX ORDER — BROMPHENIRAMINE MALEATE, PSEUDOEPHEDRINE HYDROCHLORIDE, AND DEXTROMETHORPHAN HYDROBROMIDE 2; 30; 10 MG/5ML; MG/5ML; MG/5ML
10 SYRUP ORAL EVERY 4 HOURS PRN
Qty: 240 ML | Refills: 0 | Status: ON HOLD | OUTPATIENT
Start: 2022-10-24 | End: 2022-11-02

## 2022-10-24 RX ORDER — DEXAMETHASONE SODIUM PHOSPHATE 4 MG/ML
4 INJECTION, SOLUTION INTRA-ARTICULAR; INTRALESIONAL; INTRAMUSCULAR; INTRAVENOUS; SOFT TISSUE
Status: COMPLETED | OUTPATIENT
Start: 2022-10-24 | End: 2022-10-24

## 2022-10-24 RX ORDER — CYANOCOBALAMIN 1000 UG/ML
1000 INJECTION, SOLUTION INTRAMUSCULAR; SUBCUTANEOUS
Status: COMPLETED | OUTPATIENT
Start: 2022-10-24 | End: 2022-10-24

## 2022-10-24 RX ADMIN — CEFTRIAXONE 1 G: 1 INJECTION, POWDER, FOR SOLUTION INTRAMUSCULAR; INTRAVENOUS at 11:10

## 2022-10-24 RX ADMIN — METHYLPREDNISOLONE ACETATE 40 MG: 80 INJECTION, SUSPENSION INTRA-ARTICULAR; INTRALESIONAL; INTRAMUSCULAR; SOFT TISSUE at 11:10

## 2022-10-24 RX ADMIN — CYANOCOBALAMIN 1000 MCG: 1000 INJECTION, SOLUTION INTRAMUSCULAR; SUBCUTANEOUS at 11:10

## 2022-10-24 RX ADMIN — DEXAMETHASONE SODIUM PHOSPHATE 4 MG: 4 INJECTION, SOLUTION INTRA-ARTICULAR; INTRALESIONAL; INTRAMUSCULAR; INTRAVENOUS; SOFT TISSUE at 11:10

## 2022-11-02 ENCOUNTER — HOSPITAL ENCOUNTER (INPATIENT)
Facility: HOSPITAL | Age: 67
LOS: 5 days | Discharge: HOME | DRG: 561 | End: 2022-11-07
Attending: FAMILY MEDICINE | Admitting: FAMILY MEDICINE
Payer: MEDICARE

## 2022-11-02 DIAGNOSIS — Z48.89 AFTERCARE FOLLOWING SURGERY FOR INJURY AND TRAUMA: ICD-10-CM

## 2022-11-02 PROCEDURE — 94761 N-INVAS EAR/PLS OXIMETRY MLT: CPT

## 2022-11-02 PROCEDURE — 97161 PT EVAL LOW COMPLEX 20 MIN: CPT

## 2022-11-02 PROCEDURE — 11000004 HC SNF PRIVATE

## 2022-11-02 PROCEDURE — 97165 OT EVAL LOW COMPLEX 30 MIN: CPT

## 2022-11-02 PROCEDURE — 25000003 PHARM REV CODE 250: Performed by: FAMILY MEDICINE

## 2022-11-02 RX ORDER — CLOPIDOGREL BISULFATE 75 MG/1
75 TABLET ORAL DAILY
Status: DISCONTINUED | OUTPATIENT
Start: 2022-11-03 | End: 2022-11-07 | Stop reason: HOSPADM

## 2022-11-02 RX ORDER — TOPIRAMATE 100 MG/1
200 TABLET, FILM COATED ORAL NIGHTLY
Status: DISCONTINUED | OUTPATIENT
Start: 2022-11-02 | End: 2022-11-07 | Stop reason: HOSPADM

## 2022-11-02 RX ORDER — HYDRALAZINE HYDROCHLORIDE 10 MG/1
10 TABLET, FILM COATED ORAL 3 TIMES DAILY
Status: DISCONTINUED | OUTPATIENT
Start: 2022-11-02 | End: 2022-11-07 | Stop reason: HOSPADM

## 2022-11-02 RX ORDER — LEVOTHYROXINE SODIUM 50 UG/1
50 TABLET ORAL
Status: DISCONTINUED | OUTPATIENT
Start: 2022-11-03 | End: 2022-11-07 | Stop reason: HOSPADM

## 2022-11-02 RX ORDER — TALC
6 POWDER (GRAM) TOPICAL NIGHTLY PRN
Status: DISCONTINUED | OUTPATIENT
Start: 2022-11-02 | End: 2022-11-07 | Stop reason: HOSPADM

## 2022-11-02 RX ORDER — DILTIAZEM HYDROCHLORIDE 120 MG/1
120 CAPSULE, COATED, EXTENDED RELEASE ORAL DAILY
Status: DISCONTINUED | OUTPATIENT
Start: 2022-11-03 | End: 2022-11-07 | Stop reason: HOSPADM

## 2022-11-02 RX ORDER — ACETAMINOPHEN 325 MG/1
650 TABLET ORAL EVERY 6 HOURS PRN
Status: DISCONTINUED | OUTPATIENT
Start: 2022-11-02 | End: 2022-11-07 | Stop reason: HOSPADM

## 2022-11-02 RX ORDER — MUPIROCIN 20 MG/G
OINTMENT TOPICAL 2 TIMES DAILY
Status: COMPLETED | OUTPATIENT
Start: 2022-11-02 | End: 2022-11-07

## 2022-11-02 RX ORDER — FUROSEMIDE 40 MG/1
40 TABLET ORAL DAILY
Status: DISCONTINUED | OUTPATIENT
Start: 2022-11-03 | End: 2022-11-07 | Stop reason: HOSPADM

## 2022-11-02 RX ORDER — NITROGLYCERIN 0.4 MG/1
0.4 TABLET SUBLINGUAL EVERY 5 MIN PRN
Status: DISCONTINUED | OUTPATIENT
Start: 2022-11-02 | End: 2022-11-07 | Stop reason: HOSPADM

## 2022-11-02 RX ORDER — METHOCARBAMOL 750 MG/1
750 TABLET, FILM COATED ORAL 3 TIMES DAILY PRN
Status: DISCONTINUED | OUTPATIENT
Start: 2022-11-02 | End: 2022-11-07 | Stop reason: HOSPADM

## 2022-11-02 RX ORDER — BUTALBITAL AND ACETAMINOPHEN 325; 50 MG/1; MG/1
TABLET ORAL EVERY 4 HOURS PRN
Status: DISCONTINUED | OUTPATIENT
Start: 2022-11-02 | End: 2022-11-02

## 2022-11-02 RX ORDER — PAROXETINE 10 MG/1
40 TABLET, FILM COATED ORAL DAILY
Status: DISCONTINUED | OUTPATIENT
Start: 2022-11-03 | End: 2022-11-07 | Stop reason: HOSPADM

## 2022-11-02 RX ORDER — LANOLIN ALCOHOL/MO/W.PET/CERES
1 CREAM (GRAM) TOPICAL 2 TIMES DAILY
Status: DISCONTINUED | OUTPATIENT
Start: 2022-11-02 | End: 2022-11-07 | Stop reason: HOSPADM

## 2022-11-02 RX ORDER — CALCIUM CARBONATE 200(500)MG
500 TABLET,CHEWABLE ORAL 2 TIMES DAILY PRN
Status: DISCONTINUED | OUTPATIENT
Start: 2022-11-02 | End: 2022-11-07 | Stop reason: HOSPADM

## 2022-11-02 RX ORDER — DULOXETIN HYDROCHLORIDE 30 MG/1
60 CAPSULE, DELAYED RELEASE ORAL 2 TIMES DAILY
Status: DISCONTINUED | OUTPATIENT
Start: 2022-11-02 | End: 2022-11-07 | Stop reason: HOSPADM

## 2022-11-02 RX ORDER — PROMETHAZINE HYDROCHLORIDE 25 MG/1
25 TABLET ORAL EVERY 6 HOURS PRN
Status: DISCONTINUED | OUTPATIENT
Start: 2022-11-02 | End: 2022-11-07 | Stop reason: HOSPADM

## 2022-11-02 RX ORDER — POTASSIUM CHLORIDE 20 MEQ/1
20 TABLET, EXTENDED RELEASE ORAL 2 TIMES DAILY
Status: DISCONTINUED | OUTPATIENT
Start: 2022-11-02 | End: 2022-11-07 | Stop reason: HOSPADM

## 2022-11-02 RX ORDER — HYDROCODONE BITARTRATE AND ACETAMINOPHEN 10; 325 MG/1; MG/1
1 TABLET ORAL EVERY 6 HOURS PRN
Status: DISCONTINUED | OUTPATIENT
Start: 2022-11-02 | End: 2022-11-02

## 2022-11-02 RX ORDER — TAMSULOSIN HYDROCHLORIDE 0.4 MG/1
0.4 CAPSULE ORAL DAILY
Status: DISCONTINUED | OUTPATIENT
Start: 2022-11-03 | End: 2022-11-07 | Stop reason: HOSPADM

## 2022-11-02 RX ORDER — AMOXICILLIN 250 MG
1 CAPSULE ORAL 2 TIMES DAILY
Status: DISCONTINUED | OUTPATIENT
Start: 2022-11-02 | End: 2022-11-07 | Stop reason: HOSPADM

## 2022-11-02 RX ORDER — METOLAZONE 2.5 MG/1
2.5 TABLET ORAL DAILY PRN
Status: DISCONTINUED | OUTPATIENT
Start: 2022-11-02 | End: 2022-11-07 | Stop reason: HOSPADM

## 2022-11-02 RX ORDER — ASPIRIN 81 MG/1
81 TABLET ORAL DAILY
Status: DISCONTINUED | OUTPATIENT
Start: 2022-11-03 | End: 2022-11-07 | Stop reason: HOSPADM

## 2022-11-02 RX ORDER — HYDROCODONE BITARTRATE AND ACETAMINOPHEN 10; 325 MG/1; MG/1
1 TABLET ORAL EVERY 4 HOURS PRN
Status: DISCONTINUED | OUTPATIENT
Start: 2022-11-02 | End: 2022-11-07 | Stop reason: HOSPADM

## 2022-11-02 RX ORDER — DOCUSATE SODIUM 100 MG/1
100 CAPSULE, LIQUID FILLED ORAL 2 TIMES DAILY
COMMUNITY
End: 2023-04-06

## 2022-11-02 RX ORDER — TAMSULOSIN HYDROCHLORIDE 0.4 MG/1
0.4 CAPSULE ORAL DAILY
COMMUNITY
End: 2022-12-21

## 2022-11-02 RX ORDER — GABAPENTIN 300 MG/1
300 CAPSULE ORAL 3 TIMES DAILY
Status: DISCONTINUED | OUTPATIENT
Start: 2022-11-02 | End: 2022-11-07 | Stop reason: HOSPADM

## 2022-11-02 RX ORDER — AMITRIPTYLINE HYDROCHLORIDE 25 MG/1
100 TABLET, FILM COATED ORAL NIGHTLY
Status: DISCONTINUED | OUTPATIENT
Start: 2022-11-02 | End: 2022-11-07 | Stop reason: HOSPADM

## 2022-11-02 RX ORDER — DOCUSATE SODIUM 100 MG/1
100 CAPSULE, LIQUID FILLED ORAL 2 TIMES DAILY
Status: DISCONTINUED | OUTPATIENT
Start: 2022-11-02 | End: 2022-11-07 | Stop reason: HOSPADM

## 2022-11-02 RX ADMIN — HYDROCODONE BITARTRATE AND ACETAMINOPHEN 1 TABLET: 10; 325 TABLET ORAL at 02:11

## 2022-11-02 RX ADMIN — HYDRALAZINE HYDROCHLORIDE 10 MG: 10 TABLET ORAL at 08:11

## 2022-11-02 RX ADMIN — DULOXETINE 60 MG: 30 CAPSULE, DELAYED RELEASE ORAL at 08:11

## 2022-11-02 RX ADMIN — FERROUS SULFATE TAB 325 MG (65 MG ELEMENTAL FE) 1 EACH: 325 (65 FE) TAB at 08:11

## 2022-11-02 RX ADMIN — AMITRIPTYLINE HYDROCHLORIDE 100 MG: 25 TABLET, FILM COATED ORAL at 08:11

## 2022-11-02 RX ADMIN — DOCUSATE SODIUM 100 MG: 100 CAPSULE, LIQUID FILLED ORAL at 08:11

## 2022-11-02 RX ADMIN — TOPIRAMATE 200 MG: 100 TABLET, FILM COATED ORAL at 08:11

## 2022-11-02 RX ADMIN — GABAPENTIN 300 MG: 300 CAPSULE ORAL at 08:11

## 2022-11-02 RX ADMIN — POTASSIUM CHLORIDE 20 MEQ: 20 TABLET, EXTENDED RELEASE ORAL at 08:11

## 2022-11-02 RX ADMIN — MUPIROCIN: 20 OINTMENT TOPICAL at 08:11

## 2022-11-02 RX ADMIN — SENNOSIDES AND DOCUSATE SODIUM 1 TABLET: 50; 8.6 TABLET ORAL at 08:11

## 2022-11-02 NOTE — PLAN OF CARE
Description: Grooming Status:   Short Term Goal: Pt will perform grooming with s/u sitting EOB.   Long Term Goal: Pt will perform grooming/oral hygiene standing at sink with mod I      LE dressing Status:   Short Term Goal: Pt will perform LE dressing with mod a.   Long Term Goal: Pt will perform LE dressing with min a.    Toileting Status:   Short Term Goal: Pt will perform toilet hygiene on BSC with s/u.  Long Term Goal: Pt will perform toilet hygiene on toilet with no AE with Mod I.    Commode Transfer:   Short Term Goal: Pt will perform BSC t/f with s/u.  Long Term Goal:  Pt will perform toilet t/f in bathroom with Mod I.     Bathing Status:   Long Term Goal: Pt will perform sponge bath with s/u with no unsafe fatigue.     Strength Status:   Long Term Goal: Pt to perform BUE strengthening with weights and/or body weight to increase ADL independence and safety    Endurance Status:   Short Term Goal:pt to perform 15 min OT treatment with 5 or greater rest breaks  Long Term Goal: pt to perform 30 min OT treat with 3 or less rest breaks

## 2022-11-02 NOTE — PLAN OF CARE
Patient would benefit from skilled PT to address the deficits detailed in eval.  Mandi Burk, PT, DPT

## 2022-11-02 NOTE — PT/OT/SLP EVAL
Physical Therapy Evaluation    Patient Name:  Anabelle Gaspar   MRN:  [id number]    Recommendations:     Discharge Recommendations:  home with home health   Discharge Equipment Recommendations: walker, rolling, 3-in-1 commode   Barriers to discharge: Decreased caregiver support    Assessment:     Anabelle Gaspar is a 66 y.o. female admitted with a medical diagnosis of R femur ORIF.  She presents with the following impairments/functional limitations:  weakness, impaired endurance, impaired functional mobility, gait instability, impaired balance, decreased lower extremity function, pain. Patient's impairments have resulted in decrease safety and idependence with functional mobility and ADLs resulting in decreased ability to return home at this time.      Rehab Prognosis: Good; patient would benefit from acute skilled PT services to address these deficits and reach maximum level of function.    Recent Surgery: * No surgery found *      Plan:     During this hospitalization, patient to be seen 5 x/week to address the identified rehab impairments via gait training, therapeutic activities, therapeutic exercises and progress toward the following goals:    Plan of Care Expires:  11/23/22    Subjective     Chief Complaint: weakness and pain   Patient/Family Comments/goals: increase mobility for safe return home independently   Pain/Comfort:  Pain Rating 1: 7/10  Location - Side 1: Right  Location - Orientation 1: generalized  Location 1: hip  Pain Addressed 1: Other (see comments)  Pain Rating Post-Intervention 1: 10/10  Location - Side 2: Right  Location - Orientation 2: generalized  Location 2: hip  Pain Addressed 2: Other (see comments) (Nursing staff entered patient's room to administer pain medication upon PT exiting patient's room.)    Patients cultural, spiritual, Mormonism conflicts given the current situation: no    Living Environment:  Patient lives at home alone. She reports having steps with a handrail to enter her  home.   Prior to admission, patients level of function was independent.  Equipment used at home: none.  DME owned (not currently used): none.  Upon discharge, patient will have assistance from unknown.    Objective:     Communicated with Ruth Ann Day RN prior to session.  Patient found HOB elevated    upon PT entry to room.    General Precautions: Standard, fall   Orthopedic Precautions:RLE weight bearing as tolerated   Braces:    Respiratory Status: Room air    Exams:  RLE Strength: Deficits: 2-/5  LLE Strength: WFL    Functional Mobility:  Bed Mobility:     Supine to Sit: moderate assistance  Sit to Supine: moderate assistance  Transfers:     Sit to Stand:  minimum assistance with rolling walker  Gait: 35 feet with RW on level surface with min A for safety   Balance: Fair       AM-PAC 6 CLICK MOBILITY  Total Score:16     Patient left HOB elevated with call button in reach.    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     HYSTERECTOMY      NECK SURGERY      SINUS SURGERY         Time Tracking:     PT Received On: 11/02/22  PT Start Time: 1400     PT Stop Time: 1410  PT Total Time (min): 10 min     Billable Minutes: Evaluation 10 minutes   Mandi Burk, PT, DPT     11/02/2022

## 2022-11-02 NOTE — NURSING
Pt arrived on floor via stretcher. Alert and oriented x4. Dressing d/I. Denies any needs at this time. Cb left within reach.

## 2022-11-02 NOTE — PT/OT/SLP EVAL
Occupational Therapy   Evaluation    Name: Anabelle Gaspar  MRN: [id number]  Admitting Diagnosis:  <principal problem not specified>  Recent Surgery: * No surgery found *      Recommendations:     Discharge Recommendations: home with home health  Discharge Equipment Recommendations:  walker, rolling, 3-in-1 commode  Barriers to discharge:       Assessment:     Anabelle Gaspar is a 66 y.o. female with a medical diagnosis of <principal problem not specified>.  Performance deficits affecting function: weakness, impaired endurance, impaired self care skills, impaired functional mobility, impaired balance, decreased lower extremity function, decreased safety awareness, pain.      Rehab Prognosis: Good; patient would benefit from acute skilled OT services to address these deficits and reach maximum level of function.       Plan:     Patient to be seen 5 x/week to address the above listed problems via therapeutic exercises  Plan of Care Expires:    Plan of Care Reviewed with: patient    Subjective     Chief Complaint: Pain  Patient/Family Comments/goals: Get stronger and go home    Occupational Profile:  Living Environment: Alone  Previous level of function: Independent  Roles and Routines: self-care, home management  Equipment Used at Home:  none  Assistance upon Discharge: family    Pain/Comfort:  Pain Rating 1: 7/10    Patients cultural, spiritual, Jain conflicts given the current situation:      Objective:     Communicated with: RN prior to session.  Patient found with bed in chair position with   upon OT entry to room.    General Precautions: Standard, fall   Orthopedic Precautions:RLE weight bearing as tolerated   Braces:    Respiratory Status: Room air    Occupational Performance:    Bed Mobility:    Patient completed Rolling/Turning to Right with minimum assistance    Functional Mobility/Transfers:  Patient completed Sit <> Stand Transfer with contact guard assistance  with  rolling walker   Functional Mobility: CGA  with RW    Activities of Daily Living:  Upper Body Dressing: supervision gown    Cognitive/Visual Perceptual:  Cognitive/Psychosocial Skills:     -       Oriented to: Person, Place, Time, and Situation     Physical Exam:  Dominant hand: -       left  Upper Extremity Range of Motion:     -       Right Upper Extremity: WFL  -       Left Upper Extremity: WFL  Upper Extremity Strength:    -       Right Upper Extremity: WFL  -       Left Upper Extremity: WFL    AMPAC 6 Click ADL:  AMPAC Total Score: 20    Treatment & Education:  Pt educated on OT role/POC.   Importance of OOB activity with staff assistance.  Importance of sitting up in the chair throughout the day as tolerated, especially for meals   Safety during functional t/f and mobility  Importance of assisting with self-care activities       Patient left with bed in chair position with call button in reach    GOALS:   Multidisciplinary Problems       Occupational Therapy Goals          Problem: Occupational Therapy    Goal Priority Disciplines Outcome Interventions   Occupational Therapy Goal     OT, PT/OT     Description: Description: Grooming Status:   Short Term Goal: Pt will perform grooming with s/u sitting EOB.   Long Term Goal: Pt will perform grooming/oral hygiene standing at sink with mod I      LE dressing Status:   Short Term Goal: Pt will perform LE dressing with mod a.   Long Term Goal: Pt will perform LE dressing with min a.    Toileting Status:   Short Term Goal: Pt will perform toilet hygiene on BSC with s/u.  Long Term Goal: Pt will perform toilet hygiene on toilet with no AE with Mod I.    Commode Transfer:   Short Term Goal: Pt will perform BSC t/f with s/u.  Long Term Goal:  Pt will perform toilet t/f in bathroom with Mod I.     Bathing Status:   Long Term Goal: Pt will perform sponge bath with s/u with no unsafe fatigue.     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     HYSTERECTOMY      NECK SURGERY      SINUS SURGERY         Time Tracking:     OT Date of Treatment: 11/02/22  OT Start Time: 1411  OT Stop Time: 1419  OT Total Time (min): 8 min    Billable Minutes:Evaluation 8 min  Adeola Wilcox OTR/L    11/2/2022

## 2022-11-03 LAB
AMORPH PHOS CRY #/AREA URNS LPF: ABNORMAL /LPF
ANION GAP SERPL CALCULATED.3IONS-SCNC: 16 MMOL/L (ref 7–16)
BACTERIA #/AREA URNS HPF: ABNORMAL /HPF
BASOPHILS # BLD AUTO: 0.04 K/UL (ref 0–0.2)
BASOPHILS NFR BLD AUTO: 0.5 % (ref 0–1)
BILIRUB UR QL STRIP: NEGATIVE
BUN SERPL-MCNC: 18 MG/DL (ref 7–18)
BUN/CREAT SERPL: 20 (ref 6–20)
CALCIUM SERPL-MCNC: 8.9 MG/DL (ref 8.5–10.1)
CHLORIDE SERPL-SCNC: 107 MMOL/L (ref 98–107)
CLARITY UR: ABNORMAL
CO2 SERPL-SCNC: 21 MMOL/L (ref 21–32)
COLOR UR: YELLOW
CREAT SERPL-MCNC: 0.91 MG/DL (ref 0.55–1.02)
DIFFERENTIAL METHOD BLD: ABNORMAL
EGFR (NO RACE VARIABLE) (RUSH/TITUS): 70 ML/MIN/1.73M²
EOSINOPHIL # BLD AUTO: 0.14 K/UL (ref 0–0.5)
EOSINOPHIL NFR BLD AUTO: 1.7 % (ref 1–4)
ERYTHROCYTE [DISTWIDTH] IN BLOOD BY AUTOMATED COUNT: 16.6 % (ref 11.5–14.5)
GLUCOSE SERPL-MCNC: 119 MG/DL (ref 74–106)
GLUCOSE UR STRIP-MCNC: NEGATIVE MG/DL
HCT VFR BLD AUTO: 38.4 % (ref 38–47)
HGB BLD-MCNC: 11.9 G/DL (ref 12–16)
IMM GRANULOCYTES # BLD AUTO: 0.07 K/UL (ref 0–0.04)
IMM GRANULOCYTES NFR BLD: 0.9 % (ref 0–0.4)
KETONES UR STRIP-SCNC: NEGATIVE MG/DL
LEUKOCYTE ESTERASE UR QL STRIP: NEGATIVE
LYMPHOCYTES # BLD AUTO: 1.19 K/UL (ref 1–4.8)
LYMPHOCYTES NFR BLD AUTO: 14.5 % (ref 27–41)
MCH RBC QN AUTO: 29.5 PG (ref 27–31)
MCHC RBC AUTO-ENTMCNC: 31 G/DL (ref 32–36)
MCV RBC AUTO: 95.3 FL (ref 80–96)
MONOCYTES # BLD AUTO: 0.94 K/UL (ref 0–0.8)
MONOCYTES NFR BLD AUTO: 11.5 % (ref 2–6)
MPC BLD CALC-MCNC: 8.8 FL (ref 9.4–12.4)
NEUTROPHILS # BLD AUTO: 5.82 K/UL (ref 1.8–7.7)
NEUTROPHILS NFR BLD AUTO: 70.9 % (ref 53–65)
NITRITE UR QL STRIP: NEGATIVE
PH UR STRIP: 8.5 PH UNITS
PLATELET # BLD AUTO: 461 K/UL (ref 150–400)
POTASSIUM SERPL-SCNC: 4.3 MMOL/L (ref 3.5–5.1)
PROT UR QL STRIP: NEGATIVE
RBC # BLD AUTO: 4.03 M/UL (ref 4.2–5.4)
RBC # UR STRIP: ABNORMAL /UL
RBC #/AREA URNS HPF: ABNORMAL /HPF
SODIUM SERPL-SCNC: 140 MMOL/L (ref 136–145)
SP GR UR STRIP: 1.01
SQUAMOUS #/AREA URNS LPF: ABNORMAL /LPF
UROBILINOGEN UR STRIP-ACNC: 0.2 MG/DL
WBC # BLD AUTO: 8.2 K/UL (ref 4.5–11)
WBC #/AREA URNS HPF: ABNORMAL /HPF

## 2022-11-03 PROCEDURE — 97116 GAIT TRAINING THERAPY: CPT

## 2022-11-03 PROCEDURE — 36415 COLL VENOUS BLD VENIPUNCTURE: CPT | Performed by: INTERNAL MEDICINE

## 2022-11-03 PROCEDURE — 99304 PR NURSING FACILITY CARE, INIT, LOW SEVERITY: ICD-10-PCS | Mod: ,,, | Performed by: INTERNAL MEDICINE

## 2022-11-03 PROCEDURE — 94761 N-INVAS EAR/PLS OXIMETRY MLT: CPT

## 2022-11-03 PROCEDURE — 25000003 PHARM REV CODE 250: Performed by: FAMILY MEDICINE

## 2022-11-03 PROCEDURE — 99304 1ST NF CARE SF/LOW MDM 25: CPT | Mod: ,,, | Performed by: INTERNAL MEDICINE

## 2022-11-03 PROCEDURE — 11000004 HC SNF PRIVATE

## 2022-11-03 PROCEDURE — 85025 COMPLETE CBC W/AUTO DIFF WBC: CPT | Performed by: INTERNAL MEDICINE

## 2022-11-03 PROCEDURE — 97110 THERAPEUTIC EXERCISES: CPT

## 2022-11-03 PROCEDURE — 25000003 PHARM REV CODE 250: Performed by: INTERNAL MEDICINE

## 2022-11-03 PROCEDURE — 81003 URINALYSIS AUTO W/O SCOPE: CPT | Performed by: FAMILY MEDICINE

## 2022-11-03 PROCEDURE — 81001 URINALYSIS AUTO W/SCOPE: CPT | Performed by: FAMILY MEDICINE

## 2022-11-03 PROCEDURE — 80048 BASIC METABOLIC PNL TOTAL CA: CPT | Performed by: INTERNAL MEDICINE

## 2022-11-03 RX ADMIN — HYDRALAZINE HYDROCHLORIDE 10 MG: 10 TABLET ORAL at 08:11

## 2022-11-03 RX ADMIN — MUPIROCIN: 20 OINTMENT TOPICAL at 08:11

## 2022-11-03 RX ADMIN — FUROSEMIDE 40 MG: 40 TABLET ORAL at 08:11

## 2022-11-03 RX ADMIN — DILTIAZEM HYDROCHLORIDE 120 MG: 120 CAPSULE, COATED, EXTENDED RELEASE ORAL at 08:11

## 2022-11-03 RX ADMIN — GABAPENTIN 300 MG: 300 CAPSULE ORAL at 08:11

## 2022-11-03 RX ADMIN — DOCUSATE SODIUM 100 MG: 100 CAPSULE, LIQUID FILLED ORAL at 08:11

## 2022-11-03 RX ADMIN — TOPIRAMATE 200 MG: 100 TABLET, FILM COATED ORAL at 09:11

## 2022-11-03 RX ADMIN — PAROXETINE HYDROCHLORIDE 40 MG: 10 TABLET, FILM COATED ORAL at 08:11

## 2022-11-03 RX ADMIN — HYDRALAZINE HYDROCHLORIDE 10 MG: 10 TABLET ORAL at 02:11

## 2022-11-03 RX ADMIN — SENNOSIDES AND DOCUSATE SODIUM 1 TABLET: 50; 8.6 TABLET ORAL at 08:11

## 2022-11-03 RX ADMIN — SENNOSIDES AND DOCUSATE SODIUM 1 TABLET: 50; 8.6 TABLET ORAL at 09:11

## 2022-11-03 RX ADMIN — LEVOTHYROXINE SODIUM 50 MCG: 0.05 TABLET ORAL at 05:11

## 2022-11-03 RX ADMIN — FERROUS SULFATE TAB 325 MG (65 MG ELEMENTAL FE) 1 EACH: 325 (65 FE) TAB at 08:11

## 2022-11-03 RX ADMIN — GABAPENTIN 300 MG: 300 CAPSULE ORAL at 09:11

## 2022-11-03 RX ADMIN — AMITRIPTYLINE HYDROCHLORIDE 100 MG: 25 TABLET, FILM COATED ORAL at 08:11

## 2022-11-03 RX ADMIN — FERROUS SULFATE TAB 325 MG (65 MG ELEMENTAL FE) 1 EACH: 325 (65 FE) TAB at 09:11

## 2022-11-03 RX ADMIN — CLOPIDOGREL BISULFATE 75 MG: 75 TABLET, FILM COATED ORAL at 08:11

## 2022-11-03 RX ADMIN — HYDROCODONE BITARTRATE AND ACETAMINOPHEN 1 TABLET: 10; 325 TABLET ORAL at 12:11

## 2022-11-03 RX ADMIN — POTASSIUM CHLORIDE 20 MEQ: 20 TABLET, EXTENDED RELEASE ORAL at 08:11

## 2022-11-03 RX ADMIN — DULOXETINE 60 MG: 30 CAPSULE, DELAYED RELEASE ORAL at 08:11

## 2022-11-03 RX ADMIN — MULTIPLE VITAMINS W/ MINERALS TAB 1 TABLET: TAB at 08:11

## 2022-11-03 RX ADMIN — ASPIRIN 81 MG: 81 TABLET, COATED ORAL at 08:11

## 2022-11-03 RX ADMIN — HYDROCODONE BITARTRATE AND ACETAMINOPHEN 1 TABLET: 10; 325 TABLET ORAL at 08:11

## 2022-11-03 RX ADMIN — TAMSULOSIN HYDROCHLORIDE 0.4 MG: 0.4 CAPSULE ORAL at 08:11

## 2022-11-03 RX ADMIN — POTASSIUM CHLORIDE 20 MEQ: 20 TABLET, EXTENDED RELEASE ORAL at 09:11

## 2022-11-03 RX ADMIN — MUPIROCIN: 20 OINTMENT TOPICAL at 09:11

## 2022-11-03 RX ADMIN — HYDRALAZINE HYDROCHLORIDE 10 MG: 10 TABLET ORAL at 09:11

## 2022-11-03 RX ADMIN — GABAPENTIN 300 MG: 300 CAPSULE ORAL at 02:11

## 2022-11-03 NOTE — PT/OT/SLP PROGRESS
"Occupational Therapy  Treatment    Anabelle Gaspar   MRN: [id number]   Admitting Diagnosis: Aftercare following surgery for injury and trauma    OT Date of Treatment: 11/03/22   OT Start Time: 1317  OT Stop Time: 1345  OT Total Time (min): 28 min    Billable Minutes:  Therapeutic Exercise 28 min               General Precautions: Standard, fall  Orthopedic Precautions: RLE weight bearing as tolerated  Braces:    Respiratory Status: Room air         Subjective:  Communicated with RN prior to session.    Pain/Comfort  Pain Rating 1: 7/10    Objective:        Functional Mobility:  Bed Mobility:       Transfers:        Functional Ambulation:     Activities of Daily Living:         Therapeutic Activities and Exercises:  Patient completed the following for increased strength to increase I with ADLs: UBE 7 min x 1x, 3# dowel- shoulder press, chest press, bicep curls x 40, red theraflex x 40 both ways, red theraband- scapular retraction x 40      AM-PAC 6 CLICK ADL   How much help from another person does this patient currently need?   1 = Unable, Total/Dependent Assistance  2 = A lot, Maximum/Moderate Assistance  3 = A little, Minimum/Contact Guard/Supervision  4 = None, Modified [place]/Independent    Putting on and taking off regular lower body clothing? : 3  Bathing (including washing, rinsing, drying)?: 3  Toileting, which includes using toilet, bedpan, or urinal? : 3  Putting on and taking off regular upper body clothing?: 3  Taking care of personal grooming such as brushing teeth?: 4  Eating meals?: 4  Daily Activity Total Score: 20     AM-PAC Raw Score CMS "G-Code Modifier Level of Impairment Assistance   6 % Total / Unable   7 - 8 CM 80 - 100% Maximal Assist   9-13 CL 60 - 80% Moderate Assist   14 - 19 CK 40 - 60% Moderate Assist   20 - 22 CJ 20 - 40% Minimal Assist   23 CI 1-20% SBA / CGA   24 CH 0% Independent/ Mod I       Patient left with bed in chair position with call button in " reach    ASSESSMENT:  Anabelle Gaspar is a 66 y.o. female with a medical diagnosis of Aftercare following surgery for injury and trauma and presents with decreased strength, decreased endurance, decreased self-care.    Rehab identified problem list/impairments: Rehab identified problem list/impairments: weakness, impaired endurance, impaired self care skills, impaired functional mobility, impaired balance, decreased lower extremity function, decreased safety awareness    Rehab potential is excellent.    Activity tolerance: Excellent    Discharge recommendations: Discharge Facility/Level of Care Needs: home with home health     Barriers to discharge:      Equipment recommendations: walker, rolling, 3-in-1 commode     GOALS:   Multidisciplinary Problems       Occupational Therapy Goals          Problem: Occupational Therapy    Goal Priority Disciplines Outcome Interventions   Occupational Therapy Goal     OT, PT/OT     Description: Description: Grooming Status:   Short Term Goal: Pt will perform grooming with s/u sitting EOB.   Long Term Goal: Pt will perform grooming/oral hygiene standing at sink with mod I      LE dressing Status:   Short Term Goal: Pt will perform LE dressing with mod a.   Long Term Goal: Pt will perform LE dressing with min a.    Toileting Status:   Short Term Goal: Pt will perform toilet hygiene on BSC with s/u.  Long Term Goal: Pt will perform toilet hygiene on toilet with no AE with Mod I.    Commode Transfer:   Short Term Goal: Pt will perform BSC t/f with s/u.  Long Term Goal:  Pt will perform toilet t/f in bathroom with Mod I.     Bathing Status:   Long Term Goal: Pt will perform sponge bath with s/u with no unsafe fatigue.     Strength Status:   Long Term Goal: Pt to perform BUE strengthening with weights and/or body weight to increase ADL independence and safety    Endurance Status:   Short Term Goal:pt to perform 15 min OT treatment with 5 or greater rest breaks  Long Term Goal: pt to  perform 30 min OT treat with 3 or less rest breaks                         Plan:  Patient to be seen 5 x/week to address the above listed problems via therapeutic exercises  Plan of Care expires:    Plan of Care reviewed with: patient       Adeola Brenden, OTR/L    11/03/2022

## 2022-11-03 NOTE — PLAN OF CARE
Problem: Adult Inpatient Plan of Care  Goal: Plan of Care Review  Outcome: Ongoing, Progressing  Goal: Patient-Specific Goal (Individualized)  Outcome: Ongoing, Progressing     Problem: Fall Injury Risk  Goal: Absence of Fall and Fall-Related Injury  Outcome: Ongoing, Progressing  Intervention: Identify and Manage Contributors  Flowsheets (Taken 11/3/2022 1517)  Self-Care Promotion:   independence encouraged   BADL personal objects within reach   BADL personal routines maintained   safe use of adaptive equipment encouraged   meal set-up provided  Medication Review/Management: medications reviewed  Intervention: Promote Injury-Free Environment  Flowsheets (Taken 11/3/2022 1517)  Safety Promotion/Fall Prevention:   assistive device/personal item within reach   instructed to call staff for mobility   side rails raised x 2   nonskid shoes/socks when out of bed   medications reviewed   Fall Risk signage in place   Fall Risk reviewed with patient/family

## 2022-11-03 NOTE — PT/OT/SLP PROGRESS
"Physical Therapy  Treatment    Anabelle Gaspar   MRN: [id number]   Admitting Diagnosis: Aftercare following surgery for injury and trauma    PT Received On: 11/03/22  PT Start Time: 1035     PT Stop Time: 1100    PT Total Time (min): 25 min       Billable Minutes:  Gait Training 10 and Therapeutic Exercise 15    Treatment Type: Treatment  PT/PTA: PT     PTA Visit Number: 0       General Precautions: Standard, fall  Orthopedic Precautions: RLE weight bearing as tolerated   Braces:    Respiratory Status: Room air    Spiritual, Cultural Beliefs, Lutheran Practices, Values that Affect Care: no    Subjective:  Communicated with RN prior to session.  "I am hurting pretty good but had one pain pill."     Pain/Comfort  Pain Rating 1: 7/10  Location - Side 1: Right  Location 1: hip  Pain Addressed 1: Pre-medicate for activity    Objective:        Functional Mobility:  Bed Mobility: supine to sit min A x 1 with R LE       Transfers: sit <> stand x 7 CGA but cuing to improve hand placement        Gait: 120' with RW, CGA, emerging reciprocal pattern       Stairs: Not performed      Treatment and Education:    Ther- Ex    Nustep    Ankle pumps    Hip adduction 20 x 3"   Heelslides    LAQ's 20 x    Hamstring Curls 20 x red tband    Quad sets  20 x 3"   SAQ's 20 x    Straight Leg Raises    Hip abduction    Horizontal ADD/ABD 10 x each           Ther-Act    Heel Raises    Mini Squats    3 way hip    Sit <> stands  7 x                        AM-PAC 6 CLICK MOBILITY  How much help from another person does this patient currently need?   1 = Unable, Total/Dependent Assistance  2 = A lot, Maximum/Moderate Assistance  3 = A little, Minimum/Contact Guard/Supervision  4 = None, Modified [place]/Independent    Turning over in bed (including adjusting bedclothes, sheets and blankets)?: 3  Sitting down on and standing up from a chair with arms (e.g., wheelchair, bedside commode, etc.): 3  Moving from lying on back to sitting on the side of " the bed?: 3  Moving to and from a bed to a chair (including a wheelchair)?: 3  Need to walk in hospital room?: 3  Climbing 3-5 steps with a railing?: 1  Basic Mobility Total Score: 16    AM-PAC Raw Score CMS G-Code Modifier Level of Impairment Assistance   6 % Total / Unable   7 - 9 CM 80 - 100% Maximal Assist   10 - 14 CL 60 - 80% Moderate Assist   15 - 19 CK 40 - 60% Moderate Assist   20 - 22 CJ 20 - 40% Minimal Assist   23 CI 1-20% SBA / CGA   24 CH 0% Independent/ Mod I     Patient left up in chair with call button in reach.    Assessment:  Anabelle Gaspar is a 66 y.o. female with a medical diagnosis of Aftercare following surgery for injury and trauma and presents with increased pain, decreased ROM, decreased balance, and impaired functional mobility.    Rehab identified problem list/impairments: Rehab identified problem list/impairments: weakness, impaired endurance, impaired self care skills, impaired functional mobility, impaired balance, decreased lower extremity function, decreased safety awareness    Rehab potential is good.    Activity tolerance: Good    Discharge recommendations: Discharge Facility/Level of Care Needs: home with home health     Barriers to discharge:      Equipment recommendations: Equipment Needed After Discharge: 3-in-1 commode, walker, rolling     GOALS:   Multidisciplinary Problems       Physical Therapy Goals          Problem: Physical Therapy    Goal Priority Disciplines Outcome Goal Variances Interventions   Physical Therapy Goal     PT, PT/OT      Description: Short Term Goals  1. Patient will complete 30 reps of B LE exercises with correct form.   2. Patient will complete sit<>stand transfers with SBA.  3. Patient will ambulate 100 feet with RW on level surfaces with CGA.     Long Term Goals   1. Patient will ambulate 300 feet with RW on level and unlevel surfaces with SBA.  2. Patient will complete all functional transfers with MOD I.  3. Patient will negotiate up and  down 5 stairs with use of handrail with SBA.                        PLAN:    Patient to be seen 5 x/week  to address the above listed problems via gait training, therapeutic activities, therapeutic exercises  Plan of Care expires: 11/23/22  Plan of Care reviewed with: patient    Germania Dinesh, PT, DPT        11/03/2022

## 2022-11-03 NOTE — H&P
Patient is a 66-year-old white male transferred from [facility] status post right hip fracture secondary to fall.  The patient fell 1 week ago at her home and had a fractured right hip.  She was admitted and had surgery done on last Friday 6 days ago.  The patient was up ambulatory in the hallway prior to discharge in transferred here for physical therapy and rehab.      Past medical history:  See old chart     Review of systems:  12 point review of systems noncontributory     Physical exam:  Patient alert orient no acute distress     HEENT:  No acute findings     Neck:  Supple no JVD bruits or masses     Lungs: No acute rales wheeze or bronchospasm     Heart:  No murmur gallop rub     Abdomen:  Soft bowel sounds normal nontender     Neurology:  No acute focal neurologic deficits     Muscle skeletal:  Right lower extremity shows postsurgical changes on the lateral right hip, no evidence of any erythematous or redness, no evidence any discharge or infection.  There is some swelling distally.  There is no evidence any  Homans of DVTs, neurovascular motor normal.    Impression:  Left hip fracture secondary to fall status post surgery    Plan:  Physical therapy occupational therapy

## 2022-11-04 PROCEDURE — 97110 THERAPEUTIC EXERCISES: CPT | Mod: CQ

## 2022-11-04 PROCEDURE — 97110 THERAPEUTIC EXERCISES: CPT

## 2022-11-04 PROCEDURE — 25000003 PHARM REV CODE 250: Performed by: SPECIALIST

## 2022-11-04 PROCEDURE — S0179 MEGESTROL 20 MG: HCPCS | Performed by: SPECIALIST

## 2022-11-04 PROCEDURE — 94761 N-INVAS EAR/PLS OXIMETRY MLT: CPT

## 2022-11-04 PROCEDURE — 97116 GAIT TRAINING THERAPY: CPT | Mod: CQ

## 2022-11-04 PROCEDURE — 11000004 HC SNF PRIVATE

## 2022-11-04 PROCEDURE — 25000003 PHARM REV CODE 250: Performed by: INTERNAL MEDICINE

## 2022-11-04 RX ORDER — MEGESTROL ACETATE 40 MG/ML
400 SUSPENSION ORAL 2 TIMES DAILY
Status: DISCONTINUED | OUTPATIENT
Start: 2022-11-04 | End: 2022-11-07 | Stop reason: HOSPADM

## 2022-11-04 RX ADMIN — HYDRALAZINE HYDROCHLORIDE 10 MG: 10 TABLET ORAL at 08:11

## 2022-11-04 RX ADMIN — LEVOTHYROXINE SODIUM 50 MCG: 0.05 TABLET ORAL at 05:11

## 2022-11-04 RX ADMIN — POTASSIUM CHLORIDE 20 MEQ: 20 TABLET, EXTENDED RELEASE ORAL at 08:11

## 2022-11-04 RX ADMIN — ASPIRIN 81 MG: 81 TABLET, COATED ORAL at 08:11

## 2022-11-04 RX ADMIN — DULOXETINE 60 MG: 30 CAPSULE, DELAYED RELEASE ORAL at 08:11

## 2022-11-04 RX ADMIN — HYDROCODONE BITARTRATE AND ACETAMINOPHEN 1 TABLET: 10; 325 TABLET ORAL at 08:11

## 2022-11-04 RX ADMIN — SENNOSIDES AND DOCUSATE SODIUM 1 TABLET: 50; 8.6 TABLET ORAL at 08:11

## 2022-11-04 RX ADMIN — HYDROCODONE BITARTRATE AND ACETAMINOPHEN 1 TABLET: 10; 325 TABLET ORAL at 12:11

## 2022-11-04 RX ADMIN — GABAPENTIN 300 MG: 300 CAPSULE ORAL at 03:11

## 2022-11-04 RX ADMIN — DOCUSATE SODIUM 100 MG: 100 CAPSULE, LIQUID FILLED ORAL at 08:11

## 2022-11-04 RX ADMIN — GABAPENTIN 300 MG: 300 CAPSULE ORAL at 08:11

## 2022-11-04 RX ADMIN — FERROUS SULFATE TAB 325 MG (65 MG ELEMENTAL FE) 1 EACH: 325 (65 FE) TAB at 08:11

## 2022-11-04 RX ADMIN — TOPIRAMATE 200 MG: 100 TABLET, FILM COATED ORAL at 08:11

## 2022-11-04 RX ADMIN — MUPIROCIN: 20 OINTMENT TOPICAL at 08:11

## 2022-11-04 RX ADMIN — MEGESTROL ACETATE 400 MG: 40 SUSPENSION ORAL at 08:11

## 2022-11-04 RX ADMIN — TAMSULOSIN HYDROCHLORIDE 0.4 MG: 0.4 CAPSULE ORAL at 08:11

## 2022-11-04 RX ADMIN — FUROSEMIDE 40 MG: 40 TABLET ORAL at 08:11

## 2022-11-04 RX ADMIN — AMITRIPTYLINE HYDROCHLORIDE 100 MG: 25 TABLET, FILM COATED ORAL at 08:11

## 2022-11-04 RX ADMIN — CLOPIDOGREL BISULFATE 75 MG: 75 TABLET, FILM COATED ORAL at 08:11

## 2022-11-04 RX ADMIN — MULTIPLE VITAMINS W/ MINERALS TAB 1 TABLET: TAB at 08:11

## 2022-11-04 RX ADMIN — PAROXETINE HYDROCHLORIDE 40 MG: 10 TABLET, FILM COATED ORAL at 08:11

## 2022-11-04 RX ADMIN — DILTIAZEM HYDROCHLORIDE 120 MG: 120 CAPSULE, COATED, EXTENDED RELEASE ORAL at 08:11

## 2022-11-04 RX ADMIN — HYDRALAZINE HYDROCHLORIDE 10 MG: 10 TABLET ORAL at 03:11

## 2022-11-04 NOTE — PT/OT/SLP PROGRESS
"Physical Therapy  Treatment    Anabelle Gaspar   MRN: [id number]   Admitting Diagnosis: Aftercare following surgery for injury and trauma    PT Received On: 11/04/22  PT Start Time: 1015     PT Stop Time: 1050    PT Total Time (min): 35 min       Billable Minutes:  Gait Training 10, Therapeutic Activity 5, and Therapeutic Exercise 20    Treatment Type: Treatment  PT/PTA: PTA     PTA Visit Number: 1       General Precautions: Standard, fall  Orthopedic Precautions: RLE weight bearing as tolerated   Braces:    Respiratory Status: Room air    Spiritual, Cultural Beliefs, Gnosticist Practices, Values that Affect Care: no    Subjective:  Communicated with supervising physical therapist prior to beginning patient's treatment session.  Patient found alert, and is agreeable to PT treatment.     Pain/Comfort  Pain Rating 1: 6/10  Location - Side 1: Right  Location - Orientation 1: generalized  Location 1: hip  Pain Addressed 1: Pre-medicate for activity    Objective:        Functional Mobility:  Bed Mobility:        Transfers: Sit<>stand Min assist x 1 to RW to prepare for gait training        Gait: Approx. 150' with RW Min assist x 1 on level indoor surface; cues for correct AD placement, and LE sequencing     Stairs: Not attempted       Physical Therapy  Treatment    Therapeutic Activities and Exercises:    Ther- Ex    Nustep    Ankle pumps 30 x    Hip adduction 30 x 5"    Heelslides    LAQ's 3 x 10    Hamstring Curls 2 x 10 red thera band    Quad sets  3 x 10, 5"    SAQ's 2 x 10   Straight Leg Raises    Hip abduction 2 x 10, red thera band    Horizontal ADD/ABD 3 x 10    Glut sets  30 x 5"        Ther-Act    Heel Raises    Mini Squats    3 way hip    Sit <> stands                          AM-PAC 6 CLICK MOBILITY  How much help from another person does this patient currently need?   1 = Unable, Total/Dependent Assistance  2 = A lot, Maximum/Moderate Assistance  3 = A little, Minimum/Contact Guard/Supervision  4 = None, Modified " [place]/Independent    Turning over in bed (including adjusting bedclothes, sheets and blankets)?: 3  Sitting down on and standing up from a chair with arms (e.g., wheelchair, bedside commode, etc.): 3  Moving from lying on back to sitting on the side of the bed?: 3  Moving to and from a bed to a chair (including a wheelchair)?: 3  Need to walk in hospital room?: 3  Climbing 3-5 steps with a railing?: 1  Basic Mobility Total Score: 16    AM-PAC Raw Score CMS G-Code Modifier Level of Impairment Assistance   6 % Total / Unable   7 - 9 CM 80 - 100% Maximal Assist   10 - 14 CL 60 - 80% Moderate Assist   15 - 19 CK 40 - 60% Moderate Assist   20 - 22 CJ 20 - 40% Minimal Assist   23 CI 1-20% SBA / CGA   24 CH 0% Independent/ Mod I     Patient left up in chair with call button in reach.    Assessment:  Anabelle Gaspar is a 66 y.o. female with a medical diagnosis of Aftercare following surgery for injury and trauma and presents with weakness, impaired endurance, impaired self care skills, impaired functional mobility, impaired balance, decreased lower extremity function, decreased safety awareness.  LPTA instructed patient in Ther Ex as tolerated by patient.  Patient requires increased time and effort and mod cues to progress through treatment.  Patient reports fatigue at end of treatment after gait training.  No adverse effects noted or reported.     Rehab identified problem list/impairments: Rehab identified problem list/impairments: weakness, impaired balance, impaired endurance, impaired self care skills, impaired functional mobility, gait instability, decreased lower extremity function, decreased safety awareness    Rehab potential is good.    Activity tolerance: Fair    Discharge recommendations: Discharge Facility/Level of Care Needs: home with home health     Barriers to discharge:      Equipment recommendations: Equipment Needed After Discharge: 3-in-1 commode, walker, rolling     GOALS:    Multidisciplinary Problems       Physical Therapy Goals          Problem: Physical Therapy    Goal Priority Disciplines Outcome Goal Variances Interventions   Physical Therapy Goal     PT, PT/OT Ongoing, Progressing     Description: Short Term Goals  1. Patient will complete 30 reps of B LE exercises with correct form.   2. Patient will complete sit<>stand transfers with SBA.  3. Patient will ambulate 100 feet with RW on level surfaces with CGA.     Long Term Goals   1. Patient will ambulate 300 feet with RW on level and unlevel surfaces with SBA.  2. Patient will complete all functional transfers with MOD I.  3. Patient will negotiate up and down 5 stairs with use of handrail with SBA.                        PLAN:    Patient to be seen 5 x/week  to address the above listed problems via gait training, therapeutic activities, therapeutic exercises  Plan of Care expires: 11/23/22  Plan of Care reviewed with: patient     Continue POC Per PT order to progress patient toward rehab goals as tolerated by patient.  ELIZABETH Nicolas     11/04/2022

## 2022-11-04 NOTE — PLAN OF CARE
Problem: Physical Therapy  Goal: Physical Therapy Goal  Description: Short Term Goals  1. Patient will complete 30 reps of B LE exercises with correct form.   2. Patient will complete sit<>stand transfers with SBA.  3. Patient will ambulate 100 feet with RW on level surfaces with CGA.     Long Term Goals   1. Patient will ambulate 300 feet with RW on level and unlevel surfaces with SBA.  2. Patient will complete all functional transfers with MOD I.  3. Patient will negotiate up and down 5 stairs with use of handrail with SBA.   Outcome: Ongoing, Progressing   Continue POC per PT order to progress patient toward rehab goals as tolerated by patient.  ELIZABETH Nicolas 11/4/2022

## 2022-11-04 NOTE — PLAN OF CARE
Problem: Adult Inpatient Plan of Care  Goal: Plan of Care Review  Outcome: Ongoing, Progressing  Flowsheets (Taken 11/4/2022 2713)  Plan of Care Reviewed With: patient  Goal: Absence of Hospital-Acquired Illness or Injury  Outcome: Ongoing, Progressing  Goal: Optimal Comfort and Wellbeing  Outcome: Ongoing, Progressing  Goal: Readiness for Transition of Care  Outcome: Ongoing, Progressing     Problem: Skin Injury Risk Increased  Goal: Skin Health and Integrity  Outcome: Ongoing, Progressing     Problem: Fall Injury Risk  Goal: Absence of Fall and Fall-Related Injury  Outcome: Ongoing, Progressing

## 2022-11-04 NOTE — CONSULTS
"  [facility] - Medical Surgical Unit  Adult Nutrition  Consult Note    SUMMARY     Recommendations    Recommendation/Intervention: 1. RDN left message for MD to recommend  appetite stimulant  Goals: Meet EEN >75% to aid in surgical WND healing  Nutrition Goal Status: new    Assessment and Plan    No new Assessment & Plan notes have been filed under this hospital service since the last note was generated.  Service: Nutrition       Malnutrition Assessment  Malnutrition Type:  (Based on assessment this pt is high risk for malnutrition.)                                    Reason for Assessment    Reason For Assessment: consult (swing bed pt)  Diagnosis:  (s/p ORIF R femur)  Relevant Medical History: Uterine ca, HTN  Interdisciplinary Rounds: did not attend  General Information Comments: RDN visited pt this a.m. and s he c/o poor appetite before and after surgery.  Meal intake ~25%  She does not like supplements. RDN enc pt to voice prefs as desired.  Her last BM was 11/3.    Nutrition Risk Screen    Nutrition Risk Screen:  (Recent surgery with poor appetite)    Nutrition/Diet History    Patient Reported Diet/Restrictions/Preferences: general  Spiritual, Cultural Beliefs, Lutheran Practices, Values that Affect Care: no  Food Allergies: NKFA  Factors Affecting Nutritional Intake: decreased appetite    Anthropometrics    Temp: 98.5 °F (36.9 °C)  Height Method: Stated  Height: 5' 4" (162.6 cm)  Height (inches): 64 in  Weight Method: Standard Scale  Weight: 57.2 kg (126 lb)  Weight (lb): 126 lb  Ideal Body Weight (IBW), Female: 120 lb  % Ideal Body Weight, Female (lb): 105 %  BMI (Calculated): 21.6  BMI Grade: 18.5-24.9 - normal  Usual Body Weight (UBW), kg:  (Denies WL)       Lab/Procedures/Meds    Pertinent Labs Reviewed: reviewed  Pertinent Medications Reviewed: reviewed  Pertinent Medications Comments: MVM, Paxil, KCl, Lasix, Synthroid, FeSO4    Physical Findings/Assessment         Estimated/Assessed " Needs    Weight Used For Calorie Calculations: 57.2 kg (126 lb)  Energy Calorie Requirements (kcal): 1714  Energy Need Method: Kcal/kg  Protein Requirements: 57-63  Weight Used For Protein Calculations: 57 kg (125 lb 10.6 oz)     Estimated Fluid Requirement Method: RDA Method  RDA Method (mL): 1714         Nutrition Prescription Ordered    Current Diet Order: Regular    Evaluation of Received Nutrient/Fluid Intake    Energy Calories Required: not meeting needs  Protein Required: not meeting needs  Fluid Required: not meeting needs  Tolerance: tolerating  % Intake of Estimated Energy Needs: 25 - 50 %  % Meal Intake: 25 - 50 %    Nutrition Risk    Level of Risk/Frequency of Follow-up: moderate - high       Monitor and Evaluation    Food and Nutrient Intake: food and beverage intake  Biochemical Data, Medical Tests and Procedures: electrolyte and renal panel  Nutrition-Focused Physical Findings: overall appearance, skin       Nutrition Follow-Up    RD Follow-up?: Yes

## 2022-11-04 NOTE — PT/OT/SLP PROGRESS
"Occupational Therapy  Treatment    Anabelle Gaspar   MRN: [id number]   Admitting Diagnosis: Aftercare following surgery for injury and trauma    OT Date of Treatment: 11/04/22   OT Start Time: 1300  OT Stop Time: 1340  OT Total Time (min): 40 min    Billable Minutes:  Therapeutic Exercise 40 min               General Precautions: Standard, fall  Orthopedic Precautions: RLE weight bearing as tolerated  Braces:    Respiratory Status: Room air         Subjective:  Communicated with RN prior to session.    Pain/Comfort  Pain Rating 1: 7/10    Objective:        Functional Mobility:  Bed Mobility:       Transfers:        Functional Ambulation:     Activities of Daily Living:         Therapeutic Activities and Exercises:  Patient completed the following for increased strength to increase I with ADLs: 3# dowel- shoulder press, chest press, bicep curls x 40, red theraflex x 40 both ways, red theraband- scapular retraction and tricep extension x 40      AM-PAC 6 CLICK ADL   How much help from another person does this patient currently need?   1 = Unable, Total/Dependent Assistance  2 = A lot, Maximum/Moderate Assistance  3 = A little, Minimum/Contact Guard/Supervision  4 = None, Modified [place]/Independent    Putting on and taking off regular lower body clothing? : 3  Bathing (including washing, rinsing, drying)?: 3  Toileting, which includes using toilet, bedpan, or urinal? : 3  Putting on and taking off regular upper body clothing?: 3  Taking care of personal grooming such as brushing teeth?: 4  Eating meals?: 4  Daily Activity Total Score: 20     AM-PAC Raw Score CMS "G-Code Modifier Level of Impairment Assistance   6 % Total / Unable   7 - 8 CM 80 - 100% Maximal Assist   9-13 CL 60 - 80% Moderate Assist   14 - 19 CK 40 - 60% Moderate Assist   20 - 22 CJ 20 - 40% Minimal Assist   23 CI 1-20% SBA / CGA   24 CH 0% Independent/ Mod I       Patient left with bed in chair position with call button in " reach    ASSESSMENT:  Anabelle Gaspar is a 66 y.o. female with a medical diagnosis of Aftercare following surgery for injury and trauma and presents with decreased strength, decreased endurance, decreased self-care.    Rehab identified problem list/impairments: Rehab identified problem list/impairments: weakness, impaired endurance, impaired self care skills, impaired functional mobility, impaired balance, decreased lower extremity function, decreased safety awareness    Rehab potential is excellent.    Activity tolerance: Excellent    Discharge recommendations: Discharge Facility/Level of Care Needs: home with home health     Barriers to discharge:      Equipment recommendations: 3-in-1 commode, walker, rolling     GOALS:   Multidisciplinary Problems       Occupational Therapy Goals          Problem: Occupational Therapy    Goal Priority Disciplines Outcome Interventions   Occupational Therapy Goal     OT, PT/OT     Description: Description: Grooming Status:   Short Term Goal: Pt will perform grooming with s/u sitting EOB.   Long Term Goal: Pt will perform grooming/oral hygiene standing at sink with mod I      LE dressing Status:   Short Term Goal: Pt will perform LE dressing with mod a.   Long Term Goal: Pt will perform LE dressing with min a.    Toileting Status:   Short Term Goal: Pt will perform toilet hygiene on BSC with s/u.  Long Term Goal: Pt will perform toilet hygiene on toilet with no AE with Mod I.    Commode Transfer:   Short Term Goal: Pt will perform BSC t/f with s/u.  Long Term Goal:  Pt will perform toilet t/f in bathroom with Mod I.     Bathing Status:   Long Term Goal: Pt will perform sponge bath with s/u with no unsafe fatigue.     Strength Status:   Long Term Goal: Pt to perform BUE strengthening with weights and/or body weight to increase ADL independence and safety    Endurance Status:   Short Term Goal:pt to perform 15 min OT treatment with 5 or greater rest breaks  Long Term Goal: pt to  perform 30 min OT treat with 3 or less rest breaks                         Plan:  Patient to be seen 5 x/week to address the above listed problems via therapeutic exercises  Plan of Care expires:    Plan of Care reviewed with: patient       Adeola Brenden, OTR/L    11/04/2022

## 2022-11-05 PROCEDURE — 25000003 PHARM REV CODE 250: Performed by: INTERNAL MEDICINE

## 2022-11-05 PROCEDURE — 25000003 PHARM REV CODE 250: Performed by: SPECIALIST

## 2022-11-05 PROCEDURE — 94761 N-INVAS EAR/PLS OXIMETRY MLT: CPT

## 2022-11-05 PROCEDURE — 11000004 HC SNF PRIVATE

## 2022-11-05 PROCEDURE — S0179 MEGESTROL 20 MG: HCPCS | Performed by: SPECIALIST

## 2022-11-05 RX ADMIN — GABAPENTIN 300 MG: 300 CAPSULE ORAL at 08:11

## 2022-11-05 RX ADMIN — HYDRALAZINE HYDROCHLORIDE 10 MG: 10 TABLET ORAL at 03:11

## 2022-11-05 RX ADMIN — POTASSIUM CHLORIDE 20 MEQ: 20 TABLET, EXTENDED RELEASE ORAL at 08:11

## 2022-11-05 RX ADMIN — ASPIRIN 81 MG: 81 TABLET, COATED ORAL at 08:11

## 2022-11-05 RX ADMIN — GABAPENTIN 300 MG: 300 CAPSULE ORAL at 03:11

## 2022-11-05 RX ADMIN — MEGESTROL ACETATE 400 MG: 40 SUSPENSION ORAL at 08:11

## 2022-11-05 RX ADMIN — DULOXETINE 60 MG: 30 CAPSULE, DELAYED RELEASE ORAL at 08:11

## 2022-11-05 RX ADMIN — MULTIPLE VITAMINS W/ MINERALS TAB 1 TABLET: TAB at 08:11

## 2022-11-05 RX ADMIN — HYDROCODONE BITARTRATE AND ACETAMINOPHEN 1 TABLET: 10; 325 TABLET ORAL at 12:11

## 2022-11-05 RX ADMIN — FERROUS SULFATE TAB 325 MG (65 MG ELEMENTAL FE) 1 EACH: 325 (65 FE) TAB at 08:11

## 2022-11-05 RX ADMIN — SENNOSIDES AND DOCUSATE SODIUM 1 TABLET: 50; 8.6 TABLET ORAL at 08:11

## 2022-11-05 RX ADMIN — TAMSULOSIN HYDROCHLORIDE 0.4 MG: 0.4 CAPSULE ORAL at 08:11

## 2022-11-05 RX ADMIN — FUROSEMIDE 40 MG: 40 TABLET ORAL at 08:11

## 2022-11-05 RX ADMIN — HYDRALAZINE HYDROCHLORIDE 10 MG: 10 TABLET ORAL at 08:11

## 2022-11-05 RX ADMIN — AMITRIPTYLINE HYDROCHLORIDE 100 MG: 25 TABLET, FILM COATED ORAL at 08:11

## 2022-11-05 RX ADMIN — MUPIROCIN: 20 OINTMENT TOPICAL at 08:11

## 2022-11-05 RX ADMIN — DILTIAZEM HYDROCHLORIDE 120 MG: 120 CAPSULE, COATED, EXTENDED RELEASE ORAL at 09:11

## 2022-11-05 RX ADMIN — LEVOTHYROXINE SODIUM 50 MCG: 0.05 TABLET ORAL at 05:11

## 2022-11-05 RX ADMIN — CLOPIDOGREL BISULFATE 75 MG: 75 TABLET, FILM COATED ORAL at 08:11

## 2022-11-05 RX ADMIN — PAROXETINE HYDROCHLORIDE 40 MG: 10 TABLET, FILM COATED ORAL at 08:11

## 2022-11-05 RX ADMIN — DOCUSATE SODIUM 100 MG: 100 CAPSULE, LIQUID FILLED ORAL at 08:11

## 2022-11-05 RX ADMIN — TOPIRAMATE 200 MG: 100 TABLET, FILM COATED ORAL at 08:11

## 2022-11-05 RX ADMIN — HYDROCODONE BITARTRATE AND ACETAMINOPHEN 1 TABLET: 10; 325 TABLET ORAL at 08:11

## 2022-11-05 NOTE — PLAN OF CARE
Problem: Adult Inpatient Plan of Care  Goal: Plan of Care Review  Outcome: Ongoing, Progressing  Flowsheets (Taken 11/5/2022 8567)  Plan of Care Reviewed With: patient  Goal: Optimal Comfort and Wellbeing  Outcome: Ongoing, Progressing  Goal: Readiness for Transition of Care  Outcome: Ongoing, Progressing     Problem: Skin Injury Risk Increased  Goal: Skin Health and Integrity  Outcome: Ongoing, Progressing     Problem: Fall Injury Risk  Goal: Absence of Fall and Fall-Related Injury  Outcome: Ongoing, Progressing

## 2022-11-06 PROCEDURE — 97110 THERAPEUTIC EXERCISES: CPT

## 2022-11-06 PROCEDURE — 25000003 PHARM REV CODE 250: Performed by: INTERNAL MEDICINE

## 2022-11-06 PROCEDURE — S0179 MEGESTROL 20 MG: HCPCS | Performed by: SPECIALIST

## 2022-11-06 PROCEDURE — 11000004 HC SNF PRIVATE

## 2022-11-06 PROCEDURE — 97530 THERAPEUTIC ACTIVITIES: CPT

## 2022-11-06 PROCEDURE — 25000003 PHARM REV CODE 250: Performed by: SPECIALIST

## 2022-11-06 PROCEDURE — 94761 N-INVAS EAR/PLS OXIMETRY MLT: CPT

## 2022-11-06 PROCEDURE — 97116 GAIT TRAINING THERAPY: CPT

## 2022-11-06 RX ADMIN — ASPIRIN 81 MG: 81 TABLET, COATED ORAL at 08:11

## 2022-11-06 RX ADMIN — FUROSEMIDE 40 MG: 40 TABLET ORAL at 08:11

## 2022-11-06 RX ADMIN — TOPIRAMATE 200 MG: 100 TABLET, FILM COATED ORAL at 08:11

## 2022-11-06 RX ADMIN — DULOXETINE 60 MG: 30 CAPSULE, DELAYED RELEASE ORAL at 08:11

## 2022-11-06 RX ADMIN — MUPIROCIN: 20 OINTMENT TOPICAL at 08:11

## 2022-11-06 RX ADMIN — SENNOSIDES AND DOCUSATE SODIUM 1 TABLET: 50; 8.6 TABLET ORAL at 08:11

## 2022-11-06 RX ADMIN — TAMSULOSIN HYDROCHLORIDE 0.4 MG: 0.4 CAPSULE ORAL at 08:11

## 2022-11-06 RX ADMIN — HYDROCODONE BITARTRATE AND ACETAMINOPHEN 1 TABLET: 10; 325 TABLET ORAL at 08:11

## 2022-11-06 RX ADMIN — PAROXETINE HYDROCHLORIDE 40 MG: 10 TABLET, FILM COATED ORAL at 08:11

## 2022-11-06 RX ADMIN — DILTIAZEM HYDROCHLORIDE 120 MG: 120 CAPSULE, COATED, EXTENDED RELEASE ORAL at 08:11

## 2022-11-06 RX ADMIN — MEGESTROL ACETATE 400 MG: 40 SUSPENSION ORAL at 08:11

## 2022-11-06 RX ADMIN — GABAPENTIN 300 MG: 300 CAPSULE ORAL at 08:11

## 2022-11-06 RX ADMIN — AMITRIPTYLINE HYDROCHLORIDE 100 MG: 25 TABLET, FILM COATED ORAL at 08:11

## 2022-11-06 RX ADMIN — GABAPENTIN 300 MG: 300 CAPSULE ORAL at 03:11

## 2022-11-06 RX ADMIN — FERROUS SULFATE TAB 325 MG (65 MG ELEMENTAL FE) 1 EACH: 325 (65 FE) TAB at 08:11

## 2022-11-06 RX ADMIN — POTASSIUM CHLORIDE 20 MEQ: 20 TABLET, EXTENDED RELEASE ORAL at 08:11

## 2022-11-06 RX ADMIN — DOCUSATE SODIUM 100 MG: 100 CAPSULE, LIQUID FILLED ORAL at 08:11

## 2022-11-06 RX ADMIN — HYDRALAZINE HYDROCHLORIDE 10 MG: 10 TABLET ORAL at 03:11

## 2022-11-06 RX ADMIN — HYDRALAZINE HYDROCHLORIDE 10 MG: 10 TABLET ORAL at 08:11

## 2022-11-06 RX ADMIN — LEVOTHYROXINE SODIUM 50 MCG: 0.05 TABLET ORAL at 05:11

## 2022-11-06 RX ADMIN — CLOPIDOGREL BISULFATE 75 MG: 75 TABLET, FILM COATED ORAL at 08:11

## 2022-11-06 RX ADMIN — MULTIPLE VITAMINS W/ MINERALS TAB 1 TABLET: TAB at 08:11

## 2022-11-06 NOTE — PT/OT/SLP PROGRESS
"Physical Therapy  Treatment    Anabelle Gaspar   MRN: [id number]   Admitting Diagnosis: Aftercare following surgery for injury and trauma    PT Received On: 11/06/22  PT Start Time: 0910     PT Stop Time: 1004    PT Total Time (min): 54 min       Billable Minutes:  Gait Training 10, Therapeutic Activity 15, and Therapeutic Exercise 29    Treatment Type: Treatment  PT/PTA: PT     PTA Visit Number: 0       General Precautions: Standard, fall  Orthopedic Precautions: RLE weight bearing as tolerated   Braces:    Respiratory Status: Room air    Spiritual, Cultural Beliefs, Restorationism Practices, Values that Affect Care: no    Subjective:  PT received from OT following treatment session. She is pleasant and agreeable to PT treatment.     Pain/Comfort  Pain Rating 1: 6/10  Location - Side 1: Right  Location - Orientation 1: generalized  Location 1: hip  Pain Addressed 1: Other (see comments) (Patient reported that she had just received her pain medication prior to therapy.)    Objective:        Functional Mobility:  Bed Mobility:        Transfers: 3 x 5 sit<>stand from francoise chair to RW with SBA.        Gait: Approx. 150' with RW Min assist x 1 on level indoor surface; cues for correct LE sequencing to achieve more equivalent step lengths and step through gait pattern     Stairs: Not attempted       Physical Therapy  Treatment    Therapeutic Activities and Exercises:    Ther- Ex    Nustep    Ankle pumps 30 x    Hip adduction 30 x 5"    Heelslides 20 x    LAQ's 3 x 10    Hamstring Curls 2 x 10 red thera band    Quad sets  3 x 10, 5"    SAQ's 3 x 10   Straight Leg Raises 2 x 10 min A    Hip abduction 3 x 10, red thera band    Horizontal ADD/ABD 3   Glut sets  30 x 5"        Ther-Act    Heel Raises 2 x 10    Mini Squats    3 way hip    Sit <> stands  3 x 5    Standing marching  3 x 10 B                     AM-PAC 6 CLICK MOBILITY  How much help from another person does this patient currently need?   1 = Unable, Total/Dependent " Assistance  2 = A lot, Maximum/Moderate Assistance  3 = A little, Minimum/Contact Guard/Supervision  4 = None, Modified [place]/Independent    Turning over in bed (including adjusting bedclothes, sheets and blankets)?: 3  Sitting down on and standing up from a chair with arms (e.g., wheelchair, bedside commode, etc.): 3  Moving from lying on back to sitting on the side of the bed?: 3  Moving to and from a bed to a chair (including a wheelchair)?: 3  Need to walk in hospital room?: 3  Climbing 3-5 steps with a railing?: 1  Basic Mobility Total Score: 16    AM-PAC Raw Score CMS G-Code Modifier Level of Impairment Assistance   6 % Total / Unable   7 - 9 CM 80 - 100% Maximal Assist   10 - 14 CL 60 - 80% Moderate Assist   15 - 19 CK 40 - 60% Moderate Assist   20 - 22 CJ 20 - 40% Minimal Assist   23 CI 1-20% SBA / CGA   24 CH 0% Independent/ Mod I     Patient left up in chair with call button in reach.    Assessment:  Anabelle Gaspar is a 66 y.o. female with a medical diagnosis of Aftercare following surgery for injury and trauma and presents with weakness, impaired endurance, impaired self care skills, impaired functional mobility, impaired balance, decreased lower extremity function, decreased safety awareness.  Patient demonstrated good carryover of exercise instruction from previous treatment sessions. She also reported that she completes exercises in her room. Patient had no reports of adverse effects to exercise and only required minimal rest breaks due to LE fatigue. PT provided close SBA for safety during standing tasks and ambulation. PT provided instruction for progression to step through gait pattern with more equivalent step lengths.   Rehab identified problem list/impairments: Rehab identified problem list/impairments: weakness, impaired endurance, impaired functional mobility, gait instability, impaired balance, decreased lower extremity function, pain    Rehab potential is good.    Activity  tolerance: Fair    Discharge recommendations: Discharge Facility/Level of Care Needs: home with home health     Barriers to discharge:      Equipment recommendations: Equipment Needed After Discharge: 3-in-1 commode, walker, rolling     GOALS:   Multidisciplinary Problems       Physical Therapy Goals          Problem: Physical Therapy    Goal Priority Disciplines Outcome Goal Variances Interventions   Physical Therapy Goal     PT, PT/OT Ongoing, Progressing     Description: Short Term Goals  1. Patient will complete 30 reps of B LE exercises with correct form.   2. Patient will complete sit<>stand transfers with SBA.  3. Patient will ambulate 100 feet with RW on level surfaces with CGA.     Long Term Goals   1. Patient will ambulate 300 feet with RW on level and unlevel surfaces with SBA.  2. Patient will complete all functional transfers with MOD I.  3. Patient will negotiate up and down 5 stairs with use of handrail with SBA.                        PLAN:    Patient to be seen 5 x/week  to address the above listed problems via gait training, therapeutic activities, therapeutic exercises  Plan of Care expires: 11/23/22  Plan of Care reviewed with: patient    Continue POC Per PT order to progress patient toward rehab goals as tolerated by patient.    Mandi Burk, PT, DPT     11/06/2022

## 2022-11-06 NOTE — PT/OT/SLP PROGRESS
"Occupational Therapy  Treatment    Anabelle Gaspar   MRN: [id number]   Admitting Diagnosis: Aftercare following surgery for injury and trauma    OT Date of Treatment: 11/06/22   OT Start Time: 0838  OT Stop Time: 0910  OT Total Time (min): 32 min    Billable Minutes:  Therapeutic Exercise 32 min               General Precautions: Standard, fall  Orthopedic Precautions: RLE weight bearing as tolerated  Braces:    Respiratory Status: Room air         Subjective:  Communicated with RN prior to session.    Pain/Comfort  Pain Rating 1: 6/10    Objective:        Functional Mobility:  Bed Mobility:       Transfers:        Functional Ambulation:     Activities of Daily Living:         Therapeutic Activities and Exercises:  Patient completed the following for increased strength to increase I with ADLs: 3# dowel- shoulder press, chest press, bicep curls x 40, red theraflex x 40 both ways, red theraband- scapular retraction and tricep extension x 40      AM-PAC 6 CLICK ADL   How much help from another person does this patient currently need?   1 = Unable, Total/Dependent Assistance  2 = A lot, Maximum/Moderate Assistance  3 = A little, Minimum/Contact Guard/Supervision  4 = None, Modified [place]/Independent    Putting on and taking off regular lower body clothing? : 3  Bathing (including washing, rinsing, drying)?: 3  Toileting, which includes using toilet, bedpan, or urinal? : 3  Putting on and taking off regular upper body clothing?: 3  Taking care of personal grooming such as brushing teeth?: 4  Eating meals?: 4  Daily Activity Total Score: 20     AM-PAC Raw Score CMS "G-Code Modifier Level of Impairment Assistance   6 % Total / Unable   7 - 8 CM 80 - 100% Maximal Assist   9-13 CL 60 - 80% Moderate Assist   14 - 19 CK 40 - 60% Moderate Assist   20 - 22 CJ 20 - 40% Minimal Assist   23 CI 1-20% SBA / CGA   24 CH 0% Independent/ Mod I       Patient left with bed in chair position with  PT notified    ASSESSMENT:  Anabelle " LIOR Gaspar is a 66 y.o. female with a medical diagnosis of Aftercare following surgery for injury and trauma and presents with decreased strength, decreased endurance, decreased self-care.    Rehab identified problem list/impairments: Rehab identified problem list/impairments: weakness, impaired endurance, impaired self care skills, impaired functional mobility, impaired balance, decreased lower extremity function, decreased safety awareness, pain    Rehab potential is excellent.    Activity tolerance: Excellent    Discharge recommendations: Discharge Facility/Level of Care Needs: home with home health     Barriers to discharge:      Equipment recommendations: 3-in-1 commode, walker, rolling     GOALS:   Multidisciplinary Problems       Occupational Therapy Goals          Problem: Occupational Therapy    Goal Priority Disciplines Outcome Interventions   Occupational Therapy Goal     OT, PT/OT     Description: Description: Grooming Status:   Short Term Goal: Pt will perform grooming with s/u sitting EOB.   Long Term Goal: Pt will perform grooming/oral hygiene standing at sink with mod I      LE dressing Status:   Short Term Goal: Pt will perform LE dressing with mod a.   Long Term Goal: Pt will perform LE dressing with min a.    Toileting Status:   Short Term Goal: Pt will perform toilet hygiene on BSC with s/u.  Long Term Goal: Pt will perform toilet hygiene on toilet with no AE with Mod I.    Commode Transfer:   Short Term Goal: Pt will perform BSC t/f with s/u.  Long Term Goal:  Pt will perform toilet t/f in bathroom with Mod I.     Bathing Status:   Long Term Goal: Pt will perform sponge bath with s/u with no unsafe fatigue.     Strength Status:   Long Term Goal: Pt to perform BUE strengthening with weights and/or body weight to increase ADL independence and safety    Endurance Status:   Short Term Goal:pt to perform 15 min OT treatment with 5 or greater rest breaks  Long Term Goal: pt to perform 30 min OT treat with  3 or less rest breaks                         Plan:  Patient to be seen 5 x/week to address the above listed problems via therapeutic exercises  Plan of Care expires:    Plan of Care reviewed with: patient       Adeola Brenden, OTR/PAOLO    11/06/2022

## 2022-11-06 NOTE — PLAN OF CARE
Problem: Adult Inpatient Plan of Care  Goal: Plan of Care Review  Outcome: Ongoing, Progressing  Flowsheets (Taken 11/6/2022 1125)  Plan of Care Reviewed With: patient  Goal: Optimal Comfort and Wellbeing  Outcome: Ongoing, Progressing  Goal: Readiness for Transition of Care  Outcome: Ongoing, Progressing     Problem: Skin Injury Risk Increased  Goal: Skin Health and Integrity  Outcome: Ongoing, Progressing     Problem: Fall Injury Risk  Goal: Absence of Fall and Fall-Related Injury  Outcome: Ongoing, Progressing

## 2022-11-07 VITALS
BODY MASS INDEX: 21.51 KG/M2 | OXYGEN SATURATION: 100 % | RESPIRATION RATE: 18 BRPM | TEMPERATURE: 99 F | SYSTOLIC BLOOD PRESSURE: 112 MMHG | DIASTOLIC BLOOD PRESSURE: 50 MMHG | WEIGHT: 126 LBS | HEIGHT: 64 IN | HEART RATE: 92 BPM

## 2022-11-07 DIAGNOSIS — Z98.890 HISTORY OF OPEN REDUCTION AND INTERNAL FIXATION (ORIF) PROCEDURE: Primary | ICD-10-CM

## 2022-11-07 PROBLEM — R07.89 ATYPICAL CHEST PAIN: Status: ACTIVE | Noted: 2022-11-07

## 2022-11-07 PROBLEM — S72.141A FRACTURE, INTERTROCHANTERIC, RIGHT FEMUR: Status: ACTIVE | Noted: 2022-11-07

## 2022-11-07 PROBLEM — G47.30 SLEEP APNEA: Status: ACTIVE | Noted: 2022-11-07

## 2022-11-07 PROBLEM — M25.50 POLYARTHRALGIA: Status: ACTIVE | Noted: 2022-11-07

## 2022-11-07 PROBLEM — E78.5 HYPERLIPIDEMIA: Status: ACTIVE | Noted: 2022-11-07

## 2022-11-07 PROBLEM — I10 HYPERTENSION: Status: ACTIVE | Noted: 2022-11-07

## 2022-11-07 PROBLEM — F41.8 DEPRESSION WITH ANXIETY: Status: ACTIVE | Noted: 2022-11-07

## 2022-11-07 PROBLEM — Z95.1 STATUS POST AORTO-CORONARY ARTERY BYPASS GRAFT: Status: ACTIVE | Noted: 2022-11-07

## 2022-11-07 PROBLEM — N17.9 AKI (ACUTE KIDNEY INJURY): Status: ACTIVE | Noted: 2022-11-07

## 2022-11-07 PROBLEM — I25.5 ISCHEMIC CARDIOMYOPATHY: Status: ACTIVE | Noted: 2022-11-07

## 2022-11-07 PROBLEM — E87.5 HYPERKALEMIA: Status: ACTIVE | Noted: 2022-11-07

## 2022-11-07 PROBLEM — Z72.89 OTHER PROBLEMS RELATED TO LIFESTYLE: Status: ACTIVE | Noted: 2022-11-07

## 2022-11-07 PROBLEM — F17.200 TOBACCO USE DISORDER: Status: ACTIVE | Noted: 2022-11-07

## 2022-11-07 PROBLEM — R21 SKIN RASH: Status: ACTIVE | Noted: 2022-11-07

## 2022-11-07 PROBLEM — J44.9 COPD (CHRONIC OBSTRUCTIVE PULMONARY DISEASE): Status: ACTIVE | Noted: 2022-11-07

## 2022-11-07 PROBLEM — H61.031 CHONDRITIS OF RIGHT EXTERNAL EAR: Status: ACTIVE | Noted: 2022-11-07
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
## 2022-11-07 PROBLEM — Z79.899 DVT PROPHYLAXIS: Status: ACTIVE | Noted: 2022-11-07

## 2022-11-07 PROBLEM — I34.0 SEVERE MITRAL REGURGITATION: Status: ACTIVE | Noted: 2022-11-07

## 2022-11-07 PROBLEM — I50.9 CONGESTIVE HEART FAILURE: Status: ACTIVE | Noted: 2022-11-07

## 2022-11-07 PROBLEM — N17.9 ACUTE KIDNEY FAILURE: Status: ACTIVE | Noted: 2022-11-07

## 2022-11-07 PROBLEM — D64.9 ANEMIA: Status: ACTIVE | Noted: 2022-11-07

## 2022-11-07 PROBLEM — I20.0 UNSTABLE ANGINA: Status: ACTIVE | Noted: 2022-11-07

## 2022-11-07 PROBLEM — E87.20 LACTIC ACIDOSIS: Status: ACTIVE | Noted: 2022-11-07

## 2022-11-07 PROBLEM — M79.7 FIBROMYALGIA: Status: ACTIVE | Noted: 2022-11-07

## 2022-11-07 PROBLEM — E78.5 DYSLIPIDEMIA: Status: ACTIVE | Noted: 2022-11-07

## 2022-11-07 PROBLEM — R06.00 DYSPNEA: Status: ACTIVE | Noted: 2022-11-07

## 2022-11-07 PROBLEM — I25.10 CORONARY ARTERY DISEASE: Status: ACTIVE | Noted: 2022-11-07

## 2022-11-07 PROBLEM — L40.9 PSORIASIS: Status: ACTIVE | Noted: 2022-11-07

## 2022-11-07 PROBLEM — R05.9 COUGH: Status: ACTIVE | Noted: 2022-11-07

## 2022-11-07 PROBLEM — G89.29 CHRONIC PAIN: Status: ACTIVE | Noted: 2022-11-07

## 2022-11-07 PROCEDURE — 94761 N-INVAS EAR/PLS OXIMETRY MLT: CPT

## 2022-11-07 PROCEDURE — 99315 NF DSCHRG MGMT 30 MIN/LESS: CPT | Mod: ,,, | Performed by: FAMILY MEDICINE

## 2022-11-07 PROCEDURE — 97110 THERAPEUTIC EXERCISES: CPT

## 2022-11-07 PROCEDURE — S0179 MEGESTROL 20 MG: HCPCS | Performed by: SPECIALIST

## 2022-11-07 PROCEDURE — 99315 PR NURSING FAC DISCHRGE DAY,1-30 MIN: ICD-10-PCS | Mod: ,,, | Performed by: FAMILY MEDICINE

## 2022-11-07 PROCEDURE — 97530 THERAPEUTIC ACTIVITIES: CPT

## 2022-11-07 PROCEDURE — 25000003 PHARM REV CODE 250: Performed by: INTERNAL MEDICINE

## 2022-11-07 PROCEDURE — 25000003 PHARM REV CODE 250: Performed by: SPECIALIST

## 2022-11-07 RX ADMIN — SENNOSIDES AND DOCUSATE SODIUM 1 TABLET: 50; 8.6 TABLET ORAL at 08:11

## 2022-11-07 RX ADMIN — TAMSULOSIN HYDROCHLORIDE 0.4 MG: 0.4 CAPSULE ORAL at 08:11

## 2022-11-07 RX ADMIN — CLOPIDOGREL BISULFATE 75 MG: 75 TABLET, FILM COATED ORAL at 08:11

## 2022-11-07 RX ADMIN — DILTIAZEM HYDROCHLORIDE 120 MG: 120 CAPSULE, COATED, EXTENDED RELEASE ORAL at 08:11

## 2022-11-07 RX ADMIN — HYDROCODONE BITARTRATE AND ACETAMINOPHEN 1 TABLET: 10; 325 TABLET ORAL at 08:11

## 2022-11-07 RX ADMIN — PAROXETINE HYDROCHLORIDE 40 MG: 10 TABLET, FILM COATED ORAL at 08:11

## 2022-11-07 RX ADMIN — FUROSEMIDE 40 MG: 40 TABLET ORAL at 08:11

## 2022-11-07 RX ADMIN — HYDRALAZINE HYDROCHLORIDE 10 MG: 10 TABLET ORAL at 08:11

## 2022-11-07 RX ADMIN — MUPIROCIN: 20 OINTMENT TOPICAL at 08:11

## 2022-11-07 RX ADMIN — FERROUS SULFATE TAB 325 MG (65 MG ELEMENTAL FE) 1 EACH: 325 (65 FE) TAB at 08:11

## 2022-11-07 RX ADMIN — POTASSIUM CHLORIDE 20 MEQ: 20 TABLET, EXTENDED RELEASE ORAL at 08:11

## 2022-11-07 RX ADMIN — DULOXETINE 60 MG: 30 CAPSULE, DELAYED RELEASE ORAL at 08:11

## 2022-11-07 RX ADMIN — DOCUSATE SODIUM 100 MG: 100 CAPSULE, LIQUID FILLED ORAL at 08:11

## 2022-11-07 RX ADMIN — ASPIRIN 81 MG: 81 TABLET, COATED ORAL at 08:11

## 2022-11-07 RX ADMIN — LEVOTHYROXINE SODIUM 50 MCG: 0.05 TABLET ORAL at 05:11

## 2022-11-07 RX ADMIN — MEGESTROL ACETATE 400 MG: 40 SUSPENSION ORAL at 08:11

## 2022-11-07 RX ADMIN — GABAPENTIN 300 MG: 300 CAPSULE ORAL at 08:11

## 2022-11-07 RX ADMIN — MULTIPLE VITAMINS W/ MINERALS TAB 1 TABLET: TAB at 08:11

## 2022-11-07 NOTE — PT/OT/SLP PROGRESS
Physical Therapy  Treatment    Anabelle Gaspar   MRN: [id number]   Admitting Diagnosis: Aftercare following surgery for injury and trauma    PT Received On: 11/07/22  PT Start Time: 1205     PT Stop Time: 1239    PT Total Time (min): 34 min       Billable Minutes:  Gait Training 10, Therapeutic Activity 24     Treatment Type: Treatment  PT/PTA: PT     PTA Visit Number: 0       General Precautions: Standard, fall  Orthopedic Precautions: RLE weight bearing as tolerated   Braces:    Respiratory Status: Room air    Spiritual, Cultural Beliefs, Yarsani Practices, Values that Affect Care: no    Subjective:  Patient had no reports of pain and is agreeable to PT treatment. Patient reported that she is discharging home today and will return for outpatient PT.     Pain/Comfort  Pain Rating 1: 0/10    Objective:        Functional Mobility:  Bed Mobility:        Transfers: 2 x 10  sit<>stand from francoise chair to RW with SBA.        Gait: Approx. 150' with RW Min assist x 1 on level indoor surface; cues for correct LE sequencing to achieve more equivalent step lengths and step through reciprocal gait pattern    Stairs: Stair training with SBA for safety and cueing for proper sequencing to negotiate stairs with non-reciprocal pattern.       Physical Therapy  Treatment    Therapeutic Activities and Exercises:    Ther- Ex    Nustep    Ankle pumps    Hip adduction    Heelslides    LAQ's    Hamstring Curls    Quad sets     SAQ's    Straight Leg Raises    Hip abduction    Horizontal ADD/ABD    Glut sets         Ther-Act    Heel Raises 2 x 10    Mini Squats 2 x 10    3 way hip 2 x 10    Sit <> stands  2 x 10    Standing marching  3 x 10 B                     AM-PAC 6 CLICK MOBILITY  How much help from another person does this patient currently need?   1 = Unable, Total/Dependent Assistance  2 = A lot, Maximum/Moderate Assistance  3 = A little, Minimum/Contact Guard/Supervision  4 = None, Modified [place]/Independent    Turning over in  bed (including adjusting bedclothes, sheets and blankets)?: 3  Sitting down on and standing up from a chair with arms (e.g., wheelchair, bedside commode, etc.): 3  Moving from lying on back to sitting on the side of the bed?: 3  Moving to and from a bed to a chair (including a wheelchair)?: 3  Need to walk in hospital room?: 3  Climbing 3-5 steps with a railing?: 3  Basic Mobility Total Score: 18    AM-PAC Raw Score CMS G-Code Modifier Level of Impairment Assistance   6 % Total / Unable   7 - 9 CM 80 - 100% Maximal Assist   10 - 14 CL 60 - 80% Moderate Assist   15 - 19 CK 40 - 60% Moderate Assist   20 - 22 CJ 20 - 40% Minimal Assist   23 CI 1-20% SBA / CGA   24 CH 0% Independent/ Mod I     Patient left up in chair with call button in reach.    Assessment:  Anabelle Gaspar is a 66 y.o. female with a medical diagnosis of Aftercare following surgery for injury and trauma and presents with weakness, impaired endurance, impaired self care skills, impaired functional mobility, impaired balance, decreased lower extremity function, decreased safety awareness. PT focused treatment session on standing functional strengthening tasks and stair training prior to DC home. PT provided visual, verbal and tactile cueing for proper LE alignment, speed, and decreased compensations with standing activities. Patient had no reports of adverse effects to treatment.     Rehab identified problem list/impairments: Rehab identified problem list/impairments: weakness, impaired endurance, impaired functional mobility, gait instability, impaired balance, decreased lower extremity function, pain    Rehab potential is good.    Activity tolerance: Fair    Discharge recommendations: Discharge Facility/Level of Care Needs: home with home health     Barriers to discharge:      Equipment recommendations: Equipment Needed After Discharge: 3-in-1 commode, walker, rolling     GOALS:   Multidisciplinary Problems       Physical Therapy Goals           Problem: Physical Therapy    Goal Priority Disciplines Outcome Goal Variances Interventions   Physical Therapy Goal     PT, PT/OT Ongoing, Progressing     Description: Short Term Goals  1. Patient will complete 30 reps of B LE exercises with correct form.   2. Patient will complete sit<>stand transfers with SBA.  3. Patient will ambulate 100 feet with RW on level surfaces with CGA.     Long Term Goals   1. Patient will ambulate 300 feet with RW on level and unlevel surfaces with SBA.  2. Patient will complete all functional transfers with MOD I.  3. Patient will negotiate up and down 5 stairs with use of handrail with SBA.                        PLAN:    Patient to be seen 5 x/week  to address the above listed problems via gait training, therapeutic activities  Plan of Care expires: 11/23/22  Plan of Care reviewed with: patient    Continue POC Per PT order to progress patient toward rehab goals as tolerated by patient.    Mandi Burk, PT, DPT     11/07/2022

## 2022-11-09 ENCOUNTER — OFFICE VISIT (OUTPATIENT)
Dept: FAMILY MEDICINE | Facility: CLINIC | Age: 67
End: 2022-11-09
Payer: COMMERCIAL

## 2022-11-09 VITALS
TEMPERATURE: 99 F | BODY MASS INDEX: 21.51 KG/M2 | OXYGEN SATURATION: 98 % | WEIGHT: 126 LBS | HEART RATE: 92 BPM | DIASTOLIC BLOOD PRESSURE: 60 MMHG | SYSTOLIC BLOOD PRESSURE: 118 MMHG | HEIGHT: 64 IN

## 2022-11-09 DIAGNOSIS — S72.001D CLOSED FRACTURE OF RIGHT HIP WITH ROUTINE HEALING, SUBSEQUENT ENCOUNTER: ICD-10-CM

## 2022-11-09 DIAGNOSIS — Z48.02 ENCOUNTER FOR REMOVAL OF SUTURES: Primary | ICD-10-CM

## 2022-11-09 PROCEDURE — 99212 PR OFFICE/OUTPT VISIT, EST, LEVL II, 10-19 MIN: ICD-10-PCS | Mod: ,,, | Performed by: FAMILY MEDICINE

## 2022-11-09 PROCEDURE — 1111F PR DISCHARGE MEDS RECONCILED W/ CURRENT OUTPATIENT MED LIST: ICD-10-PCS | Mod: CPTII,,, | Performed by: FAMILY MEDICINE

## 2022-11-09 PROCEDURE — 3078F PR MOST RECENT DIASTOLIC BLOOD PRESSURE < 80 MM HG: ICD-10-PCS | Mod: CPTII,,, | Performed by: FAMILY MEDICINE

## 2022-11-09 PROCEDURE — 99024 SUTURE REMOVAL: ICD-10-PCS | Mod: ,,, | Performed by: FAMILY MEDICINE

## 2022-11-09 PROCEDURE — 1111F DSCHRG MED/CURRENT MED MERGE: CPT | Mod: CPTII,,, | Performed by: FAMILY MEDICINE

## 2022-11-09 PROCEDURE — 3008F PR BODY MASS INDEX (BMI) DOCUMENTED: ICD-10-PCS | Mod: CPTII,,, | Performed by: FAMILY MEDICINE

## 2022-11-09 PROCEDURE — 3288F PR FALLS RISK ASSESSMENT DOCUMENTED: ICD-10-PCS | Mod: CPTII,,, | Performed by: FAMILY MEDICINE

## 2022-11-09 PROCEDURE — 1159F MED LIST DOCD IN RCRD: CPT | Mod: CPTII,,, | Performed by: FAMILY MEDICINE

## 2022-11-09 PROCEDURE — 99212 OFFICE O/P EST SF 10 MIN: CPT | Mod: ,,, | Performed by: FAMILY MEDICINE

## 2022-11-09 PROCEDURE — 1100F PTFALLS ASSESS-DOCD GE2>/YR: CPT | Mod: CPTII,,, | Performed by: FAMILY MEDICINE

## 2022-11-09 PROCEDURE — 3288F FALL RISK ASSESSMENT DOCD: CPT | Mod: CPTII,,, | Performed by: FAMILY MEDICINE

## 2022-11-09 PROCEDURE — 1100F PR PT FALLS ASSESS DOC 2+ FALLS/FALL W/INJURY/YR: ICD-10-PCS | Mod: CPTII,,, | Performed by: FAMILY MEDICINE

## 2022-11-09 PROCEDURE — 3078F DIAST BP <80 MM HG: CPT | Mod: CPTII,,, | Performed by: FAMILY MEDICINE

## 2022-11-09 PROCEDURE — 3074F SYST BP LT 130 MM HG: CPT | Mod: CPTII,,, | Performed by: FAMILY MEDICINE

## 2022-11-09 PROCEDURE — 3008F BODY MASS INDEX DOCD: CPT | Mod: CPTII,,, | Performed by: FAMILY MEDICINE

## 2022-11-09 PROCEDURE — 3074F PR MOST RECENT SYSTOLIC BLOOD PRESSURE < 130 MM HG: ICD-10-PCS | Mod: CPTII,,, | Performed by: FAMILY MEDICINE

## 2022-11-09 PROCEDURE — 1159F PR MEDICATION LIST DOCUMENTED IN MEDICAL RECORD: ICD-10-PCS | Mod: CPTII,,, | Performed by: FAMILY MEDICINE

## 2022-11-09 PROCEDURE — 99024 POSTOP FOLLOW-UP VISIT: CPT | Mod: ,,, | Performed by: FAMILY MEDICINE

## 2022-11-09 NOTE — PROGRESS NOTES
Vicente Guzmán MD   [facility]  [address] [place] [address]     PATIENT NAME: Anabelle Gaspar  : 1955  DATE: 22  MRN: [id number]      Billing Provider: Vicente Guzmán MD  Level of Service:   Patient PCP Information       Provider PCP Type    Vicente Guzmán MD General            Reason for Visit / Chief Complaint: Follow-up (Remove staples from right hip. ORIF of right hip on 10/28/22 after falling.)         History of Present Illness / Problem Focused Workflow     Anabelle Gaspar presents to the clinic with Follow-up (Remove staples from right hip. ORIF of right hip on 10/28/22 after falling.)     HPI    Review of Systems     Review of Systems   Constitutional:  Negative for activity change, appetite change, fatigue and fever.   HENT:  Negative for nasal congestion, ear pain, hearing loss, sinus pressure/congestion and sore throat.    Respiratory:  Negative for cough, chest tightness and shortness of breath.    Cardiovascular:  Negative for chest pain and palpitations.   Gastrointestinal:  Negative for abdominal pain and fecal incontinence.   Genitourinary:  Negative for bladder incontinence and difficulty urinating.   Musculoskeletal:  Negative for arthralgias.   Integumentary:  Negative for rash.   Neurological:  Negative for dizziness and headaches.      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     HYSTERECTOMY      NECK SURGERY      SINUS SURGERY         Social History  Anabelle Gaspar  reports that she has been smoking cigarettes. She started smoking about 50 years ago. She has been smoking an average of 2 packs per day. She has never used  smokeless tobacco. She reports that she does not currently use alcohol. She reports that she does not use drugs.    Family History  Anabelle Gaspar  family history includes Cancer in her sister; Diabetes in her mother and sister; Heart disease in her mother; Hyperlipidemia in her mother and sister; Hypertension in her mother and sister.    Medications and Allergies     Medications  Outpatient Medications Marked as Taking for the 11/9/22 encounter (Office Visit) with Vicnete Guzmán MD   Medication Sig Dispense Refill    amitriptyline (ELAVIL) 100 MG tablet Take 100 mg by mouth nightly.      aspirin (ECOTRIN) 81 MG EC tablet Take 81 mg by mouth once daily.      butalbitaL-acetaminophen  mg Tab TAKE 1 OR 2 TABLETS BY MOUTH EVERY 4 HOURS AS NEEDED FOR PAIN      CALCIUM CITRATE ORAL Take 1 tablet by mouth once daily.      clopidogreL (PLAVIX) 75 mg tablet Take 75 mg by mouth once daily.      diltiaZEM (CARDIZEM CD) 120 MG Cp24 TAKE 1 CAPSULE BY MOUTH EVERYDAY FOR BLOOD PRESSURE.      docusate sodium (COLACE) 100 MG capsule Take 100 mg by mouth 2 (two) times daily.      DULoxetine (CYMBALTA) 60 MG capsule Take 60 mg by mouth 2 (two) times daily.      ergocalciferol, vitamin D2, (VITAMIN D ORAL) Take 1 tablet by mouth once daily.      ferrous sulfate (FEOSOL) Tab tablet Take 1 tablet by mouth 2 (two) times daily.      furosemide (LASIX) 80 MG tablet Take 80 mg by mouth 2 (two) times a day.      gabapentin (NEURONTIN) 300 MG capsule Take 300 mg by mouth 3 (three) times daily.      hydrALAZINE (APRESOLINE) 10 MG tablet Take 10 mg by mouth 3 (three) times daily.      HYDROcodone-acetaminophen (NORCO)  mg per tablet TAKE 1 TABLET BY MOUTH 4 TIMES A DAY IF NEEDED FOR PAIN.      isosorbide mononitrate (IMDUR) 60 MG 24 hr tablet Take 60 mg by mouth 2 (two) times daily.      levothyroxine (SYNTHROID) 50 MCG tablet TAKE 1 TABLET BEFORE BREAKFAST 90 tablet 1    methocarbamoL (ROBAXIN) 750 MG Tab Take  750 mg by mouth 3 (three) times daily as needed.      metOLazone (ZAROXOLYN) 2.5 MG tablet Take 2.5 mg by mouth daily as needed. Take as needed for a weight gain of 3+      multivitamin (THERAGRAN) per tablet Take 1 tablet by mouth once daily.      nitroGLYCERIN (NITROSTAT) 0.4 MG SL tablet Place 0.4 mg under the tongue every 5 (five) minutes as needed for Chest pain.      paroxetine (PAXIL) 40 MG tablet TAKE 1 TABLET ONE TIME DAILY 90 tablet 0    potassium chloride SA (K-DUR,KLOR-CON) 20 MEQ tablet Take 1 tablet by mouth 2 (two) times a day.      promethazine (PHENERGAN) 25 MG tablet TAKE 1 TABLET BY MOUTH EVERYDAY IF NEEDED      tamsulosin (FLOMAX) 0.4 mg Cap Take 0.4 mg by mouth once daily.      topiramate (TOPAMAX) 200 MG Tab Take 200 mg by mouth nightly.         Allergies  Review of patient's allergies indicates:   Allergen Reactions    Levaquin [levofloxacin] Swelling    Dexamethasone Edema and Other (See Comments)    Prednisone Other (See Comments)       Physical Examination     Vitals:    11/09/22 1455   BP: 118/60   Pulse: 92   Temp: 98.6 °F (37 °C)     Physical Exam  Constitutional:       General: She is not in acute distress.     Appearance: She is not ill-appearing.   HENT:      Head: Normocephalic and atraumatic.      Right Ear: Tympanic membrane and ear canal normal.      Left Ear: Tympanic membrane and ear canal normal.      Nose: Nose normal. No congestion or rhinorrhea.   Eyes:      Pupils: Pupils are equal, round, and reactive to light.   Cardiovascular:      Rate and Rhythm: Normal rate and regular rhythm.      Pulses: Normal pulses.      Heart sounds: No murmur heard.  Pulmonary:      Effort: No respiratory distress.      Breath sounds: No wheezing, rhonchi or rales.   Abdominal:      General: Bowel sounds are normal.      Palpations: Abdomen is soft.      Tenderness: There is no abdominal tenderness.      Hernia: No hernia is present.   Musculoskeletal:         General: Tenderness  (right hip) present.      Cervical back: Normal range of motion and neck supple.   Lymphadenopathy:      Cervical: No cervical adenopathy.   Skin:     General: Skin is warm and dry.   Neurological:      Mental Status: She is alert.   Psychiatric:         Behavior: Behavior normal.         Thought Content: Thought content normal.        Assessment and Plan (including Health Maintenance)   :    Plan:         Health Maintenance Due   Topic Date Due    Hepatitis C Screening  Never done    Pneumococcal Vaccines (Age 65+) (1 - PCV) Never done    HIV Screening  Never done    TETANUS VACCINE  Never done    DEXA Scan  Never done    Shingles Vaccine (1 of 2) Never done    Pap Smear  01/29/2017    COVID-19 Vaccine (2 - Moderna series) 09/14/2021    Influenza Vaccine (1) 09/01/2022       Problem List Items Addressed This Visit    None    There are no diagnoses linked to this encounter.   Health Maintenance Topics with due status: Not Due       Topic Last Completion Date    Colorectal Cancer Screening 07/03/2019    Lipid Panel 10/26/2021    Mammogram 07/28/2022       Suture Removal    Date/Time: 11/9/2022 2:40 PM  Location procedure was performed: [facility]  Performed by: Vicente Guzmán MD  Authorized by: Vicente Guzmán MD   Body area: lower extremity  Location details: right hip  Wound Appearance: clean and well healed  Staples Removed: 8  Post-removal: Steri-Strips applied  Complications: No  Specimens: No  Implants: No  Patient tolerance: Patient tolerated the procedure well with no immediate complications         Future Appointments   Date Time Provider Department Center   11/10/2022  8:00 AM Mandi Burk, PT [facility] REHAB [place]   9/28/2023  2:00 PM [facility] [email or web address] MAMMO [facility]        No follow-ups on file.       Signature:  Vicente Guzámn MD  [facility]  [address] [place] [address]  [phone] Phone  [phone]  Fax    Date of encounter: 11/9/22

## 2022-11-10 ENCOUNTER — CLINICAL SUPPORT (OUTPATIENT)
Dept: REHABILITATION | Facility: HOSPITAL | Age: 67
End: 2022-11-10
Payer: MEDICARE

## 2022-11-10 DIAGNOSIS — M25.551 RIGHT HIP PAIN: ICD-10-CM

## 2022-11-10 DIAGNOSIS — Z98.890 HISTORY OF OPEN REDUCTION AND INTERNAL FIXATION (ORIF) PROCEDURE: ICD-10-CM

## 2022-11-10 PROCEDURE — 97110 THERAPEUTIC EXERCISES: CPT

## 2022-11-10 PROCEDURE — 97530 THERAPEUTIC ACTIVITIES: CPT

## 2022-11-10 PROCEDURE — 97161 PT EVAL LOW COMPLEX 20 MIN: CPT

## 2022-11-10 NOTE — DISCHARGE SUMMARY
[facility]  Hospital Medicine  Discharge Summary      Patient Name: Anabelle Gaspar  MRN: [id number]  DREW: [id number]  Patient Class: IP- Swing  Admission Date: 11/2/2022  Hospital Length of Stay: 5 days  Discharge Date and Time:  11/10/2022 4:35 PM  Attending Physician: No att. providers found   Discharging Provider: Jazzy Morris MD  Primary Care Provider: Vicente Guzmán MD    Primary Care Team: Networked reference to record PCT     HPI:   No notes on file    * No surgery found *      Hospital Course:   11/7/22 - pt. Is doing well. Wants to go home to continue therapy as an outpatient.       Goals of Care Treatment Preferences:  Code Status: Full Code      Consults:   Consults (From admission, onward)        Status Ordering Provider     Inpatient consult to Registered Dietitian/Nutritionist  Once        Provider:  (Not yet assigned)    DIAN Olson          No new Assessment & Plan notes have been filed under this hospital service since the last note was generated.  Service: Hospital Medicine    Final Active Diagnoses:    Diagnosis Date Noted POA    PRINCIPAL PROBLEM:  Aftercare following surgery for injury and trauma [Z48.89] 11/02/2022 Not Applicable      Problems Resolved During this Admission:       Discharged Condition: stable    Disposition: Home or Self Care    Follow Up:   Follow-up Information     Vicente Guzmán MD. Go in 2 day(s).    Specialty: Family Medicine  Why: November 9, 2022 at 11:40 AM.  Contact information:  [address] [facility] [address]  [phone]             Brenden Duckworth MD Follow up on 11/28/2022.    Specialty: Orthopedic Surgery  Why: at 12:30 PM  Contact information:  [address] [facility] [address]  [phone]                       Patient Instructions:   No discharge procedures on file.    Significant Diagnostic Studies: Labs: All labs within the past 24 hours  have been reviewed    Pending Diagnostic Studies:     None         Medications:  Reconciled Home Medications:      Medication List      CONTINUE taking these medications    amitriptyline 100 MG tablet  Commonly known as: ELAVIL  Take 100 mg by mouth nightly.     aspirin 81 MG EC tablet  Commonly known as: ECOTRIN  Take 81 mg by mouth once daily.     butalbitaL-acetaminophen  mg Tab  TAKE 1 OR 2 TABLETS BY MOUTH EVERY 4 HOURS AS NEEDED FOR PAIN     CALCIUM CITRATE ORAL  Take 1 tablet by mouth once daily.     clopidogreL 75 mg tablet  Commonly known as: PLAVIX  Take 75 mg by mouth once daily.     diltiaZEM 120 MG Cp24  Commonly known as: CARDIZEM CD  TAKE 1 CAPSULE BY MOUTH EVERYDAY FOR BLOOD PRESSURE.     docusate sodium 100 MG capsule  Commonly known as: COLACE  Take 100 mg by mouth 2 (two) times daily.     DULoxetine 60 MG capsule  Commonly known as: CYMBALTA  Take 60 mg by mouth 2 (two) times daily.     ferrous sulfate Tab tablet  Commonly known as: FEOSOL  Take 1 tablet by mouth 2 (two) times daily.     furosemide 80 MG tablet  Commonly known as: LASIX  Take 80 mg by mouth 2 (two) times a day.     gabapentin 300 MG capsule  Commonly known as: NEURONTIN  Take 300 mg by mouth 3 (three) times daily.     hydrALAZINE 10 MG tablet  Commonly known as: APRESOLINE  Take 10 mg by mouth 3 (three) times daily.     HYDROcodone-acetaminophen  mg per tablet  Commonly known as: NORCO  TAKE 1 TABLET BY MOUTH 4 TIMES A DAY IF NEEDED FOR PAIN.     isosorbide mononitrate 60 MG 24 hr tablet  Commonly known as: IMDUR  Take 60 mg by mouth 2 (two) times daily.     methocarbamoL 750 MG Tab  Commonly known as: ROBAXIN  Take 750 mg by mouth 3 (three) times daily as needed.     metOLazone 2.5 MG tablet  Commonly known as: ZAROXOLYN  Take 2.5 mg by mouth daily as needed. Take as needed for a weight gain of 3+     multivitamin per tablet  Commonly known as: THERAGRAN  Take 1 tablet by mouth once daily.     nitroGLYCERIN 0.4 MG SL  tablet  Commonly known as: NITROSTAT  Place 0.4 mg under the tongue every 5 (five) minutes as needed for Chest pain.     paroxetine 40 MG tablet  Commonly known as: PAXIL  TAKE 1 TABLET ONE TIME DAILY     potassium chloride SA 20 MEQ tablet  Commonly known as: K-DUR,KLOR-CON  Take 1 tablet by mouth 2 (two) times a day.     promethazine 25 MG tablet  Commonly known as: PHENERGAN  TAKE 1 TABLET BY MOUTH EVERYDAY IF NEEDED     tamsulosin 0.4 mg Cap  Commonly known as: FLOMAX  Take 0.4 mg by mouth once daily.     topiramate 200 MG Tab  Commonly known as: TOPAMAX  Take 200 mg by mouth nightly.     VITAMIN D ORAL  Take 1 tablet by mouth once daily.        ASK your doctor about these medications    levothyroxine 50 MCG tablet  Commonly known as: SYNTHROID  TAKE 1 TABLET BEFORE BREAKFAST  Ask about: Which instructions should I use?            Indwelling Lines/Drains at time of discharge:   Lines/Drains/Airways     None                 Time spent on the discharge of patient: 30 minutes         Jazzy Morris MD  Department of Hospital Medicine  [facility]

## 2022-11-10 NOTE — PLAN OF CARE
[facility]  Physical Therapy Initial Evaluation    Name: Rosario Gaspar  Clinic Number: [id number]    Therapy Diagnosis: No diagnosis found.  Physician: Jazzy Morris    Physician Orders: PT Eval and Treat   Medical Diagnosis from Referral: s/p R femur ORIF   Evaluation Date: 11/10/2022  Authorization Period Expiration: 11/10/2023  Plan of Care Expiration: 12/23/2022  Visit # / Visits authorized: 1/ 12    Time In: 8:10  Time Out: 9:00  Total Appointment Time (timed & untimed codes): 50 minutes    Precautions: Standard    Subjective   Date of onset: 10/31/2022  History of current condition - ROSARIO reports: falling and breaking her hip resulting in R femur ORIF and following swingbed rehabilitation until 11/07/2022. Patient reports that she still has difficulty lifting her leg into bed. Patient also reports continued R hip pain especially in the morning.      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    Medications:   Rosario has a current medication list which includes the following prescription(s): amitriptyline, aspirin, butalbital-acetaminophen, calcium citrate, clopidogrel, diltiazem, docusate sodium, duloxetine, ergocalciferol (vitamin d2), ferrous sulfate, furosemide, gabapentin, hydralazine, hydrocodone-acetaminophen, isosorbide mononitrate, levothyroxine, methocarbamol, metolazone, multivitamin, nitroglycerin, paroxetine, potassium chloride sa, promethazine, tamsulosin, and topiramate.    Allergies:   Review of patient's allergies indicates:   Allergen Reactions    Levaquin [levofloxacin] Swelling    Dexamethasone Edema and Other (See Comments)    Prednisone Other (See Comments)     "  Imaging, X-rays     Prior Therapy: Swingbed rehabilitation following surgery   Social History:  lives with spouse   Occupation: retired  Prior Level of Function: independent   Current Level of Function: min assist for higher level ADLs and mod I for mobility     Pain:  Current 7/10, worst 9/10, best 4/10   Location: right hip and thigh    Description: Throbbing  Aggravating Factors: no identifiable aggravating factors   Easing Factors: pain medication    Pts goals: "to be able to walk on my own."     Objective     Range of motion:  Motion Right Left    Hip flexion  Limited due to weakness   WITHIN FUNCTIONAL LIMITS   Hip extension  WITHIN FUNCTIONAL LIMITS  WITHIN FUNCTIONAL LIMITS   Hip abduction  WITHIN FUNCTIONAL LIMITS  WITHIN FUNCTIONAL LIMITS   Hip adduction  WITHIN FUNCTIONAL LIMITS  WITHIN FUNCTIONAL LIMITS   Knee extension  WITHIN FUNCTIONAL LIMITS  WITHIN FUNCTIONAL LIMITS   Knee flexion  WITHIN FUNCTIONAL LIMITS  WITHIN FUNCTIONAL LIMITS   Ankle DF  WITHIN FUNCTIONAL LIMITS  WITHIN FUNCTIONAL LIMITS   Ankle PF  WITHIN FUNCTIONAL LIMITS  WITHIN FUNCTIONAL LIMITS   Ankle Inversion  WITHIN FUNCTIONAL LIMITS  WITHIN FUNCTIONAL LIMITS   Ankle Eversion  WITHIN FUNCTIONAL LIMITS  WITHIN FUNCTIONAL LIMITS     Manual muscle test   Muscle Right  Left    Hip flexion  MMT strength: 3-/5  MMT strength: 4+/5   Hip extension  MMT strength: 3/5  MMT strength: 4+/5   Hip abduction  MMT strength: 3/5  MMT strength: 4+/5   Hip adduction  MMT strength: 3/5  MMT strength: 4+/5   Knee extension  MMT strength: 4-/5  MMT strength: 4+/5   Knee flexion  MMT strength: 4-/5  MMT strength: 4+/5   Ankle DF  MMT strength: 4+/5  MMT strength: 4+/5   Ankle PF  MMT strength: 4+/5  MMT strength: 4+/5   Ankle inversion  MMT strength: 4+/5  MMT strength: 4+/5   Ankle eversion  MMT strength: 4+/5  MMT strength: 4+/5       Gait:  Weight bearing precautions: WBAT  Assistive device: rolling walker  Ambulation distance and deviations: " "community distances with decreased R stance time, decreased R toe off, and occasional step to gait pattern   Gait Speed: 1.51 feet per second with RW     Clinical Special Tests:  5 x sit <>stand : unable to complete without upper extremity use     TREATMENT   Treatment Time In: 8:20  Treatment Time Out: 9:00  Total Treatment time (time-based codes) separate from Evaluation: 40 minutes    ROSARIO received the treatments listed below:  THERAPEUTIC EXERCISES to develop strength and endurance for 20 minutes including :See flowsheet below     THERAPEUTIC ACTIVITIES to improve dynamic and functional performance for 12 minutes including :See flowsheet below.      Ther-Ex  Reps        LAQs  20 x 3"    HS Curls  20 x red TB    Hip ADD  20 x 3"    Hip ABD  20 x red TB    SLR  2 x 10 min A    Quad Sets  20 x 3"    Horizontal hip ABD  2 x 10        Ther-Act  Reps        Heel raises  2 x 10    Mini Squats  2 x 10    Sit<>stand  10 x    3 way hip  2 x 10 each R LE      COLD PACK: applied to R hip for 8 mins to decrease post exercise irritation     Home Exercises and Patient Education Provided                Written Home Exercises Provided: yes.  Exercises were reviewed and ROSARIO was able to demonstrate them prior to the end of the session.  ROSARIO demonstrated good  understanding of the education provided.     See EMR under Patient Instructions for exercises provided 11/10/2022.    Assessment   Rosario is a 66 y.o. female referred to outpatient Physical Therapy with a medical diagnosis of s/p R femur ORIF. Pt presents with R LE muscle weakness and decreased mobility.     PT provided patient with cueing for LE alignment, form, speed, and hold times with ther-ex and standing activities included in today's treatment session. Patient had no reports of increased pain or adverse effects to treatment.     Pt prognosis is Good.   Pt will benefit from skilled outpatient Physical Therapy to address the deficits stated above and in the chart " below, provide pt/family education, and to maximize pt's level of independence.     Plan of care discussed with patient: Yes  Pt's spiritual, cultural and educational needs considered and patient is agreeable to the plan of care and goals as stated below:     Anticipated Barriers for therapy: compliance with HEP and treatment     Goals:  Patient will have increased R LE muscle strength to 4/5 for increased independence with tranfers.   Patient will complete 5 x sit<>stand with no UE use in less than or equal to 15 seconds for increased functional independence.   Patient will ambulate community distance with a straight cane or no AD with gait speed greater than or equal to 2.75 feet per second for increased safety with community ambulation   Patient will report decreased R hip pain to less than or equal to 4/10 for improved quality of life.     Plan   Plan of care Certification: 11/10/2022 to 12/23/2022.    Outpatient Physical Therapy 2 times weekly for 6 weeks to include the following interventions: Gait Training, Moist Heat/ Ice, Patient Education, Therapeutic Activities, and Therapeutic Exercise.     Mandi Burk, PT, DPT

## 2022-11-10 NOTE — PLAN OF CARE
[facility]  Physical Therapy Initial Evaluation    Name: Rosario Gaspar  Clinic Number: [id number]    Therapy Diagnosis: No diagnosis found.  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    Medications:   Rosario has a current medication list which includes the following prescription(s): amitriptyline, aspirin, butalbital-acetaminophen, calcium citrate, clopidogrel, diltiazem, docusate sodium, duloxetine, ergocalciferol (vitamin d2), ferrous sulfate, furosemide, gabapentin, hydralazine, hydrocodone-acetaminophen, isosorbide mononitrate, levothyroxine, methocarbamol, metolazone, multivitamin, nitroglycerin, paroxetine, potassium chloride sa, promethazine, tamsulosin, and topiramate.    Allergies:   Review of patient's allergies indicates:   Allergen Reactions    Levaquin [levofloxacin] Swelling    Dexamethasone Edema and Other (See Comments)    Prednisone Other (See Comments)        Imaging, X-rays     Prior Therapy: Swingbed rehabilitation following surgery   Social History:  lives with an adult   Occupation: retired  Prior Level of Function: independent   Current Level of Function: min assist for higher level ADLs and  "mod I for mobility     Pain:  Current {0-10:20507::"0"}/10, worst {0-10:20507::"0"}/10, best {0-10:20507::"0"}/10   Location: {RIGHT LEFT BILATERAL:23786} {LOCATION ON BODY:33667}  Description: {Pain Description:41149}  Aggravating Factors: {Causes; Pain:55993}  Easing Factors: {Pain (activities that relieve):05875}    Pts goals: ***    Objective     Incisions: ***      Range of motion:  Motion Right Left    Hip flexion  {wfl range of motion:36001}  {wfl range of motion:03672}   Hip extension  {wfl range of motion:62299}  {wfl range of motion:53241}   Hip abduction  {wfl range of motion:42772}  {wfl range of motion:69046}   Hip adduction  {wfl range of motion:60468}  {wfl range of motion:10656}   Internal rotation  {wfl range of motion:33547}  {wfl range of motion:08773}   External rotation  {wfl range of motion:07315}  {wfl range of motion:16020}   Knee extension  {wfl range of motion:23315}  {wfl range of motion:06654}   Knee flexion  {wfl range of motion:82457}  {wfl range of motion:14239}   Ankle DF  {wfl range of motion:37542}  {wfl range of motion:59726}   Ankle PF  {wfl range of motion:35302}  {wfl range of motion:04468}   Ankle Inversion  {wfl range of motion:92931}  {wfl range of motion:97196}   Ankle Eversion  {wfl range of motion:92590}  {wfl range of motion:16026}       Manual muscle test   Muscle Right  Left    Hip flexion  {MMT strength:04836:::1}  {MMT strength:58671:::1}   Hip extension  {MMT strength:45016:::1}  {MMT strength:52253:::1}   Hip abduction  {MMT strength:60370:::1}  {MMT strength:41628:::1}   Hip adduction  {MMT strength:98834:::1}  {MMT strength:42033:::1}   Hip internal rotation  {MMT strength:27118:::1}  {MMT strength:11830:::1}   Hip external rotation  {MMT strength:49221:::1}  {MMT strength:31702:::1}   Knee extension  {MMT strength:15338:::1}  {MMT strength:69078:::1}   Knee flexion  {MMT strength:96036:::1}  {MMT strength:15388:::1}   Ankle DF  {MMT strength:44621:::1}  {MMT " strength:17064:::1}   Ankle PF  {MMT strength:88122:::1}  {MMT strength:41963:::1}   Ankle inversion  {MMT strength:66210:::1}  {MMT strength:87433:::1}   Ankle eversion  {MMT strength:30533:::1}  {MMT strength:49716:::1}       Gait:  Weight bearing precautions: {Weight bearing restriction:90727}  Assistive device: {Assistive Device:81573}  Ambulation distance and deviations:  Stairs:  Comments:      Clinical Special Tests:  N/A post operative       TREATMENT   Treatment Time In: ***  Treatment Time Out: ***  Total Treatment time (time-based codes) separate from Evaluation: *** minutes    ROSARIO received the treatments listed below:  THERAPEUTIC EXERCISES to develop {AMB PT PROGRESS OBJECTIVE:14561} for *** minutes including ***  THERAPEUTIC ACTIVITIES to improve dynamic and functional performance for *** minutes including ***.  GAIT TRAINING to improve functional mobility and safety for *** minutes.    Home Exercises and Patient Education Provided             Education provided:   - ***    Written Home Exercises Provided: yes.  Exercises were reviewed and ROSARIO was able to demonstrate them prior to the end of the session.  ROSARIO demonstrated good  understanding of the education provided.     See EMR under Patient Instructions for exercises provided 11/10/2022.    Assessment   Rosario is a 66 y.o. female referred to outpatient Physical Therapy with a medical diagnosis of s/p R femur ORIF. Pt presents with ***    Pt prognosis is Good.   Pt will benefit from skilled outpatient Physical Therapy to address the deficits stated above and in the chart below, provide pt/family education, and to maximize pt's level of independence.     Plan of care discussed with patient: Yes  Pt's spiritual, cultural and educational needs considered and patient is agreeable to the plan of care and goals as stated below:     Anticipated Barriers for therapy: compliance with HEP and treatment     Goals:    Plan   Plan of care Certification:  11/10/2022 to ***.    Outpatient Physical Therapy 2 times weekly for 6 weeks to include the following interventions: Gait Training, Moist Heat/ Ice, Patient Education, Therapeutic Activities, and Therapeutic Exercise.     Mandi Burk, PT, DPT

## 2022-11-10 NOTE — PLAN OF CARE
[facility]  Physical Therapy Initial Evaluation    Name: Rosario Gaspar  Clinic Number: [id number]    Therapy Diagnosis: No diagnosis found.  Physician: Jazzy Morris    Physician Orders: PT Eval and Treat   Medical Diagnosis from Referral: s/p R femur ORIF   Evaluation Date: 11/10/2022  Authorization Period Expiration: 11/10/2023  Plan of Care Expiration: 12/23/2022  Visit # / Visits authorized: 1/ 12    Time In: 8:10  Time Out: 9:00  Total Appointment Time (timed & untimed codes): 50 minutes    Precautions: Standard    Subjective   Date of onset: 10/31/2022  History of current condition - ROSARIO reports: falling and breaking her hip resulting in R femur ORIF and following swingbed rehabilitation until 11/07/2022. Patient reports that she still has difficulty lifting her leg into bed. Patient also reports continued R hip pain especially in the morning.      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    Medications:   Rosario has a current medication list which includes the following prescription(s): amitriptyline, aspirin, butalbital-acetaminophen, calcium citrate, clopidogrel, diltiazem, docusate sodium, duloxetine, ergocalciferol (vitamin d2), ferrous sulfate, furosemide, gabapentin, hydralazine, hydrocodone-acetaminophen, isosorbide mononitrate, levothyroxine, methocarbamol, metolazone, multivitamin, nitroglycerin, paroxetine, potassium chloride sa, promethazine, tamsulosin, and topiramate.    Allergies:   Review of patient's allergies indicates:   Allergen Reactions    Levaquin [levofloxacin] Swelling    Dexamethasone Edema and Other (See Comments)    Prednisone Other (See Comments)     "  Imaging, X-rays     Prior Therapy: Swingbed rehabilitation following surgery   Social History:  lives with spouse   Occupation: retired  Prior Level of Function: independent   Current Level of Function: min assist for higher level ADLs and mod I for mobility     Pain:  Current 7/10, worst 9/10, best 4/10   Location: right hip and thigh    Description: Throbbing  Aggravating Factors: no identifiable aggravating factors   Easing Factors: pain medication    Pts goals: "to be able to walk on my own."     Objective     Range of motion:  Motion Right Left    Hip flexion  Limited due to weakness   WITHIN FUNCTIONAL LIMITS   Hip extension  WITHIN FUNCTIONAL LIMITS  WITHIN FUNCTIONAL LIMITS   Hip abduction  WITHIN FUNCTIONAL LIMITS  WITHIN FUNCTIONAL LIMITS   Hip adduction  WITHIN FUNCTIONAL LIMITS  WITHIN FUNCTIONAL LIMITS   Knee extension  WITHIN FUNCTIONAL LIMITS  WITHIN FUNCTIONAL LIMITS   Knee flexion  WITHIN FUNCTIONAL LIMITS  WITHIN FUNCTIONAL LIMITS   Ankle DF  WITHIN FUNCTIONAL LIMITS  WITHIN FUNCTIONAL LIMITS   Ankle PF  WITHIN FUNCTIONAL LIMITS  WITHIN FUNCTIONAL LIMITS   Ankle Inversion  WITHIN FUNCTIONAL LIMITS  WITHIN FUNCTIONAL LIMITS   Ankle Eversion  WITHIN FUNCTIONAL LIMITS  WITHIN FUNCTIONAL LIMITS     Manual muscle test   Muscle Right  Left    Hip flexion  MMT strength: 3-/5  MMT strength: 4+/5   Hip extension  MMT strength: 3/5  MMT strength: 4+/5   Hip abduction  MMT strength: 3/5  MMT strength: 4+/5   Hip adduction  MMT strength: 3/5  MMT strength: 4+/5   Knee extension  MMT strength: 4-/5  MMT strength: 4+/5   Knee flexion  MMT strength: 4-/5  MMT strength: 4+/5   Ankle DF  MMT strength: 4+/5  MMT strength: 4+/5   Ankle PF  MMT strength: 4+/5  MMT strength: 4+/5   Ankle inversion  MMT strength: 4+/5  MMT strength: 4+/5   Ankle eversion  MMT strength: 4+/5  MMT strength: 4+/5       Gait:  Weight bearing precautions: WBAT  Assistive device: rolling walker  Ambulation distance and deviations: " "community distances with decreased R stance time, decreased R toe off, and occasional step to gait pattern   Gait Speed: 1.51 feet per second with RW     Clinical Special Tests:  5 x sit <>stand : unable to complete without upper extremity use     TREATMENT   Treatment Time In: 8:20  Treatment Time Out: 9:00  Total Treatment time (time-based codes) separate from Evaluation: 40 minutes    ROSARIO received the treatments listed below:  THERAPEUTIC EXERCISES to develop strength and endurance for 20 minutes including :See flowsheet below     THERAPEUTIC ACTIVITIES to improve dynamic and functional performance for 12 minutes including :See flowsheet below.      Ther-Ex  Reps        LAQs  20 x 3"    HS Curls  20 x red TB    Hip ADD  20 x 3"    Hip ABD  20 x red TB    SLR  2 x 10 min A    Quad Sets  20 x 3"    Horizontal hip ABD  2 x 10        Ther-Act  Reps        Heel raises  2 x 10    Mini Squats  2 x 10    Sit<>stand  10 x    3 way hip  2 x 10 each R LE      COLD PACK: applied to R hip for 8 mins to decrease post exercise irritation     Home Exercises and Patient Education Provided                 Written Home Exercises Provided: yes.  Exercises were reviewed and ROSARIO was able to demonstrate them prior to the end of the session.  ROSARIO demonstrated good  understanding of the education provided.     See EMR under Patient Instructions for exercises provided 11/10/2022.    Assessment   Rosario is a 66 y.o. female referred to outpatient Physical Therapy with a medical diagnosis of s/p R femur ORIF. Pt presents with R LE muscle weakness and decreased mobility.     PT provided patient with cueing for LE alignment, form, speed, and hold times with ther-ex and standing activities included in today's treatment session. Patient had no reports of increased pain or adverse effects to treatment.     Pt prognosis is Good.   Pt will benefit from skilled outpatient Physical Therapy to address the deficits stated above and in the chart " below, provide pt/family education, and to maximize pt's level of independence.     Plan of care discussed with patient: Yes  Pt's spiritual, cultural and educational needs considered and patient is agreeable to the plan of care and goals as stated below:     Anticipated Barriers for therapy: compliance with HEP and treatment     Goals:  Patient will have increased R LE muscle strength to 4/5 for increased independence with tranfers.   Patient will complete 5 x sit<>stand with no UE use in less than or equal to 15 seconds for increased functional independence.   Patient will ambulate community distance with a straight cane or no AD with gait speed greater than or equal to 2.75 feet per second for increased safety with community ambulation   Patient will report decreased R hip pain to less than or equal to 4/10 for improved quality of life.     Plan   Plan of care Certification: 11/10/2022 to 12/23/2022.    Outpatient Physical Therapy 2 times weekly for 6 weeks to include the following interventions: Gait Training, Moist Heat/ Ice, Patient Education, Therapeutic Activities, and Therapeutic Exercise.     Mandi Burk, PT, DPT

## 2022-11-15 ENCOUNTER — CLINICAL SUPPORT (OUTPATIENT)
Dept: REHABILITATION | Facility: HOSPITAL | Age: 67
End: 2022-11-15
Payer: MEDICARE

## 2022-11-15 DIAGNOSIS — M25.551 RIGHT HIP PAIN: Primary | ICD-10-CM

## 2022-11-15 PROCEDURE — 97110 THERAPEUTIC EXERCISES: CPT | Mod: CQ

## 2022-11-15 PROCEDURE — 97530 THERAPEUTIC ACTIVITIES: CPT | Mod: CQ

## 2022-11-15 NOTE — PROGRESS NOTES
"[facility]   Physical Therapy Treatment Note     Name: Rosario Gaspar  Clinic Number: [id number]    Therapy Diagnosis: No diagnosis found.  Physician: Jazzy Morris*    Visit Date: 11/15/2022    Physician Orders: PT Eval and Treat   Medical Diagnosis from Referral: s/p R femur ORIF   Evaluation Date: 11/10/2022  Authorization Period Expiration: 11/10/2023  Plan of Care Expiration: 12/23/2022  Visit # / Visits authorized: 2/12     Time In: 14:03  Time Out: 14:49  Total Appointment Time (timed & untimed codes): 46 minutes     Precautions: Standard    PTA Visit #: 1/5       SUBJECTIVE     Pt reports: "I'm hurting today. This weather doesn't help my fibromyalgia and my hip is hurting too".  She was compliant with home exercise program.  Response to previous treatment:   Functional change:     Pain: 7/10  Location: right hip      OBJECTIVE     Objective Measures updated at progress report unless specified.     Treatment        Ther-Ex  Reps          LAQs  20 x 3"    HS Curls  20 x red TB    Hip ADD  30 x 3"    Hip ABD  20 x red TB    SLR  2 x 10 min A    Quad Sets  20 x 3"    Horizontal hip ABD  2 x 10          Ther-Act  Reps          Heel raises  2 x 10    Mini Squats  2 x 10    Sit<>stand  10 x    3 way hip  2 x 10  R LE             Patient Education and Home Exercises     Home Exercises Provided and Patient Education Provided     Education provided:   - HEP, POC    Written Home Exercises Provided: Patient instructed to cont prior HEP. Exercises were reviewed and ROSARIO was able to demonstrate them prior to the end of the session.  ROSARIO demonstrated good  understanding of the education provided. See EMR under Patient Instructions for exercises provided during therapy sessions    ASSESSMENT   Rosario is a 66 y.o. female referred to outpatient Physical Therapy with a medical diagnosis of s/p R femur ORIF. Pt presents with R LE muscle weakness and decreased mobility.  Pt required visual, " verbal, and tactile cues for proper form, hold times, count, and posture. Pt required minimal rest breaks with tx activities. Pt progressed through tx session without any c/o pain. No adverse effects noted to tx.     PT provided patient with cueing for LE alignment, form, speed, and hold times with ther-ex and standing activities included in today's treatment session. Patient had no reports of increased pain or adverse effects to treatment.      Pt prognosis is Good.   Pt will benefit from skilled outpatient Physical Therapy to address the deficits stated above and in the chart below, provide pt/family education, and to maximize pt's level of independence.      Plan of care discussed with patient: Yes  Pt's spiritual, cultural and educational needs considered and patient is agreeable to the plan of care and goals as stated below:      Anticipated Barriers for therapy: compliance with HEP and treatment      Goals:  Patient will have increased R LE muscle strength to 4/5 for increased independence with tranfers.   Patient will complete 5 x sit<>stand with no UE use in less than or equal to 15 seconds for increased functional independence.   Patient will ambulate community distance with a straight cane or no AD with gait speed greater than or equal to 2.75 feet per second for increased safety with community ambulation   Patient will report decreased R hip pain to less than or equal to 4/10 for improved quality of life.     PLAN   Continue POC per PT orders to progress patient toward rehab goals.       ELIZABETH Cunningham  11/15/2022

## 2022-11-21 ENCOUNTER — CLINICAL SUPPORT (OUTPATIENT)
Dept: REHABILITATION | Facility: HOSPITAL | Age: 67
End: 2022-11-21
Payer: MEDICARE

## 2022-11-21 DIAGNOSIS — M25.551 RIGHT HIP PAIN: Primary | ICD-10-CM

## 2022-11-21 PROCEDURE — 97530 THERAPEUTIC ACTIVITIES: CPT

## 2022-11-21 PROCEDURE — 97110 THERAPEUTIC EXERCISES: CPT

## 2022-11-21 NOTE — PROGRESS NOTES
"[facility]   Physical Therapy Treatment Note     Name: Rosario Gaspar  Clinic Number: [id number]    Therapy Diagnosis:   Encounter Diagnosis   Name Primary?    Right hip pain Yes     Physician: Jazzy Morris*    Visit Date: 11/21/2022    Physician Orders: PT Eval and Treat   Medical Diagnosis from Referral: s/p R femur ORIF   Evaluation Date: 11/10/2022  Authorization Period Expiration: 11/10/2023  Plan of Care Expiration: 12/23/2022  Visit # / Visits authorized: 3/12     Time In: 14:00  Time Out: 14:44  Total Appointment Time (timed & untimed codes): 44 minutes     Precautions: Standard    PTA Visit #: 0/5     SUBJECTIVE     Pt reports: " I was hurting so bad yesterday I didn't want to get up and move."   She was compliant with home exercise program.  Response to previous treatment:   Functional change:     Pain: 8/10  Location: right hip      OBJECTIVE     Objective Measures updated at progress report unless specified.     Treatment        Ther-Ex  Reps          LAQs  30 x 3"    HS Curls  20 x red TB    Hip ADD  30 x 3"    Hip ABD  20 x red TB    SLR  2 x 10    Quad Sets  30 x 3"    Horizontal hip ABD  2 x 10     Glute Sets  30 x    Ther-Act  Reps     Standing Marching  2 x 10    Heel raises  3  x 10    Mini Squats  2 x 10    Sit<>stand  2  x 10    3 way hip  2 x 10  R LE             Patient Education and Home Exercises     Home Exercises Provided and Patient Education Provided     Education provided:   - HEP, POC    Written Home Exercises Provided: Patient instructed to cont prior HEP. Exercises were reviewed and ROSARIO was able to demonstrate them prior to the end of the session.  ROSARIO demonstrated good  understanding of the education provided. See EMR under Patient Instructions for exercises provided during therapy sessions    ASSESSMENT   Rosario is a 66 y.o. female referred to outpatient Physical Therapy with a medical diagnosis of s/p R femur ORIF. Pt presents with R LE " muscle weakness and decreased mobility. PT did not increase resistance due to increased R hip pain today. PT provided patient with verbal and visual cueing throughout session for proper LE alignment, form, and decreased compensations with exercises. Patient had no reports of increased pain or adverse effects to treatment.      PT provided patient with cueing for LE alignment, form, speed, and hold times with ther-ex and standing activities included in today's treatment session. Patient had no reports of increased pain or adverse effects to treatment.      Pt prognosis is Good.   Pt will benefit from skilled outpatient Physical Therapy to address the deficits stated above and in the chart below, provide pt/family education, and to maximize pt's level of independence.      Plan of care discussed with patient: Yes  Pt's spiritual, cultural and educational needs considered and patient is agreeable to the plan of care and goals as stated below:      Anticipated Barriers for therapy: compliance with HEP and treatment      Goals:  Patient will have increased R LE muscle strength to 4/5 for increased independence with tranfers.   Patient will complete 5 x sit<>stand with no UE use in less than or equal to 15 seconds for increased functional independence.   Patient will ambulate community distance with a straight cane or no AD with gait speed greater than or equal to 2.75 feet per second for increased safety with community ambulation   Patient will report decreased R hip pain to less than or equal to 4/10 for improved quality of life.     PLAN   Continue POC per PT orders to progress patient toward rehab goals.     Mandi Burk, PT, DPT     11/21/2022

## 2022-11-23 ENCOUNTER — CLINICAL SUPPORT (OUTPATIENT)
Dept: REHABILITATION | Facility: HOSPITAL | Age: 67
End: 2022-11-23
Payer: MEDICARE

## 2022-11-23 DIAGNOSIS — M25.551 RIGHT HIP PAIN: Primary | ICD-10-CM

## 2022-11-23 PROCEDURE — 97530 THERAPEUTIC ACTIVITIES: CPT | Mod: CQ

## 2022-11-23 PROCEDURE — 97110 THERAPEUTIC EXERCISES: CPT | Mod: CQ

## 2022-11-23 NOTE — PROGRESS NOTES
"[facility]   Physical Therapy Treatment Note     Name: Rosario Gaspar  Clinic Number: [id number]    Therapy Diagnosis:   Encounter Diagnosis   Name Primary?    Right hip pain Yes     Physician: Jazzy Morris*    Visit Date: 11/23/2022    Physician Orders: PT Eval and Treat   Medical Diagnosis from Referral: s/p R femur ORIF   Evaluation Date: 11/10/2022  Authorization Period Expiration: 11/10/2023  Plan of Care Expiration: 12/23/2022  Visit # / Visits authorized: 4/12     Time In: 13:05  Time Out: 13:46  Total Appointment Time (timed & untimed codes): 41 minutes     Precautions: Standard    PTA Visit #: 1/5     SUBJECTIVE     Pt reports: "I got to sleep on my stomach for the first time in a long time last night."   She was compliant with home exercise program.  Response to previous treatment:   Functional change:     Pain: 6/10  Location: right hip      OBJECTIVE     Objective Measures updated at progress report unless specified.     Treatment        Ther-Ex  Reps    NuStep 8 minutes    LAQs  30 x 3"    HS Curls  20 x red TB    Hip ADD  30 x 3"    Hip ABD  20 x red TB    SLR  2 x 10    Quad Sets  30 x 3"    Horizontal hip ABD  2 x 10     Glute Sets  30 x    Ther-Act  Reps     Standing Marching  2 x 10    Heel raises  3  x 10    Mini Squats  2 x 10    Sit<>stand  2  x 10    3 way hip  2 x 10  R LE             Patient Education and Home Exercises     Home Exercises Provided and Patient Education Provided     Education provided:   - HEP, POC    Written Home Exercises Provided: Patient instructed to cont prior HEP. Exercises were reviewed and ROSARIO was able to demonstrate them prior to the end of the session.  ROSARIO demonstrated good  understanding of the education provided. See EMR under Patient Instructions for exercises provided during therapy sessions    ASSESSMENT   Rosario is a 66 y.o. female referred to outpatient Physical Therapy with a medical diagnosis of s/p R femur ORIF. Pt " presents with R LE muscle weakness and decreased mobility. Pt requried verbal, visual, and tactile cues to progress through standing activities with proper posture, speed, LE alignment, and decreased compensations. Pt presents with less R hip pain today compared to previous tx session.  Pt had no reports of increased pain or adverse effects to tx.     Pt prognosis is Good.   Pt will benefit from skilled outpatient Physical Therapy to address the deficits stated above and in the chart below, provide pt/family education, and to maximize pt's level of independence.      Plan of care discussed with patient: Yes  Pt's spiritual, cultural and educational needs considered and patient is agreeable to the plan of care and goals as stated below:      Anticipated Barriers for therapy: compliance with HEP and treatment      Goals:  Patient will have increased R LE muscle strength to 4/5 for increased independence with tranfers.   Patient will complete 5 x sit<>stand with no UE use in less than or equal to 15 seconds for increased functional independence.   Patient will ambulate community distance with a straight cane or no AD with gait speed greater than or equal to 2.75 feet per second for increased safety with community ambulation   Patient will report decreased R hip pain to less than or equal to 4/10 for improved quality of life.     PLAN   Continue POC per PT orders to progress patient toward rehab goals.       ELIZABETH Cunningham  11/23/2022

## 2022-11-29 ENCOUNTER — CLINICAL SUPPORT (OUTPATIENT)
Dept: REHABILITATION | Facility: HOSPITAL | Age: 67
End: 2022-11-29
Payer: MEDICARE

## 2022-11-29 DIAGNOSIS — M25.551 RIGHT HIP PAIN: Primary | ICD-10-CM

## 2022-11-29 PROCEDURE — 97530 THERAPEUTIC ACTIVITIES: CPT | Mod: CQ

## 2022-11-29 PROCEDURE — 97110 THERAPEUTIC EXERCISES: CPT | Mod: CQ

## 2022-11-29 NOTE — PROGRESS NOTES
"[facility]   Physical Therapy Treatment Note     Name: Rosario Gaspar  Clinic Number: [id number]    Therapy Diagnosis:   Encounter Diagnosis   Name Primary?    Right hip pain Yes     Physician: Jazzy Morris*    Visit Date: 11/29/2022    Physician Orders: PT Eval and Treat   Medical Diagnosis from Referral: s/p R femur ORIF   Evaluation Date: 11/10/2022  Authorization Period Expiration: 11/10/2023  Plan of Care Expiration: 12/23/2022  Visit # / Visits authorized: 5/12     Time In: 13:01  Time Out: 13:50  Total Appointment Time (timed & untimed codes): 49 minutes     Precautions: Standard    PTA Visit #: 2/5     SUBJECTIVE     Pt reports: "This weather has my hip hurting today."   She was compliant with home exercise program.  Response to previous treatment:   Functional change:     Pain: 6/10  Location: right hip      OBJECTIVE     Objective Measures updated at progress report unless specified.     Treatment      THERAPEUTIC EXERCISES to develop strength and endurance. See flowsheet below: *indicates increase in repetitions      THERAPEUTIC ACTIVITIES to improve dynamic and functional performance. See flowsheet below: * increase in repetitions  Ther-Ex  Reps    NuStep 8 minutes    LAQs  3 x 10 1.5#   HS Curls  30 x red TB *   Hip ADD  30 x 3"    Hip ABD  30 x red TB *   SLR  3 x 10 *   Quad Sets  30 x 3"    Horizontal hip ABD  2 x 10    Glute Sets  30 x    Ther-Act  Reps     Standing Marching  3 x 10 *   Heel raises  3  x 10    Mini Squats  3 x 10 *   Sit<>stand  2  x 10    3 way hip  3 x 10  R LE *            Patient Education and Home Exercises     Home Exercises Provided and Patient Education Provided     Education provided:   - HEP, POC    Written Home Exercises Provided: Patient instructed to cont prior HEP. Exercises were reviewed and ROSARIO was able to demonstrate them prior to the end of the session.  ROSARIO demonstrated good  understanding of the education provided. See EMR " under Patient Instructions for exercises provided during therapy sessions    ASSESSMENT   Anabelle is a 66 y.o. female referred to outpatient Physical Therapy with a medical diagnosis of s/p R femur ORIF. Pt presents with R LE muscle weakness and decreased mobility. LPTA provided pt with proper setup on NuStep to increase hip ROM and decrease stiffness. Pt continues to require verbal, visual, and tactile cues to progress through standing activities with proper posture, speed, LE alignment, and decreased compensations. LPTA noted some signs of quad lag during SLR. Pt had no reports of pain and no adverse effects noted.     Pt prognosis is Good.   Pt will benefit from skilled outpatient Physical Therapy to address the deficits stated above and in the chart below, provide pt/family education, and to maximize pt's level of independence.      Plan of care discussed with patient: Yes  Pt's spiritual, cultural and educational needs considered and patient is agreeable to the plan of care and goals as stated below:      Anticipated Barriers for therapy: compliance with HEP and treatment      Goals:  Patient will have increased R LE muscle strength to 4/5 for increased independence with tranfers.   Patient will complete 5 x sit<>stand with no UE use in less than or equal to 15 seconds for increased functional independence.   Patient will ambulate community distance with a straight cane or no AD with gait speed greater than or equal to 2.75 feet per second for increased safety with community ambulation   Patient will report decreased R hip pain to less than or equal to 4/10 for improved quality of life.     PLAN   Continue POC per PT orders to progress patient toward rehab goals.     ELIZABETH Cunningham  11/29/2022

## 2022-12-08 ENCOUNTER — OFFICE VISIT (OUTPATIENT)
Dept: FAMILY MEDICINE | Facility: CLINIC | Age: 67
End: 2022-12-08
Payer: MEDICARE

## 2022-12-08 VITALS
SYSTOLIC BLOOD PRESSURE: 110 MMHG | HEART RATE: 92 BPM | OXYGEN SATURATION: 99 % | WEIGHT: 122.19 LBS | DIASTOLIC BLOOD PRESSURE: 46 MMHG | HEIGHT: 64 IN | BODY MASS INDEX: 20.86 KG/M2 | TEMPERATURE: 99 F

## 2022-12-08 DIAGNOSIS — R55 SYNCOPE, UNSPECIFIED SYNCOPE TYPE: Primary | ICD-10-CM

## 2022-12-08 DIAGNOSIS — D64.9 ANEMIA, UNSPECIFIED TYPE: ICD-10-CM

## 2022-12-08 LAB
ANION GAP SERPL CALCULATED.3IONS-SCNC: 9 MMOL/L (ref 7–16)
BUN SERPL-MCNC: 18 MG/DL (ref 7–18)
BUN/CREAT SERPL: 17 (ref 6–20)
CALCIUM SERPL-MCNC: 9 MG/DL (ref 8.5–10.1)
CHLORIDE SERPL-SCNC: 104 MMOL/L (ref 98–107)
CO2 SERPL-SCNC: 25 MMOL/L (ref 21–32)
CREAT SERPL-MCNC: 1.05 MG/DL (ref 0.55–1.02)
EGFR (NO RACE VARIABLE) (RUSH/TITUS): 59 ML/MIN/1.73M²
EKG 12-LEAD: ABNORMAL
GLUCOSE SERPL-MCNC: 97 MG/DL (ref 74–106)
POTASSIUM SERPL-SCNC: 3.4 MMOL/L (ref 3.5–5.1)
PR INTERVAL: 162
PRT AXES: ABNORMAL
QRS DURATION: 94
QT/QTC: ABNORMAL
SODIUM SERPL-SCNC: 135 MMOL/L (ref 136–145)
VENTRICULAR RATE: 92

## 2022-12-08 PROCEDURE — 3008F BODY MASS INDEX DOCD: CPT | Mod: ,,, | Performed by: FAMILY MEDICINE

## 2022-12-08 PROCEDURE — 3288F FALL RISK ASSESSMENT DOCD: CPT | Mod: ,,, | Performed by: FAMILY MEDICINE

## 2022-12-08 PROCEDURE — 1100F PTFALLS ASSESS-DOCD GE2>/YR: CPT | Mod: ,,, | Performed by: FAMILY MEDICINE

## 2022-12-08 PROCEDURE — 3288F PR FALLS RISK ASSESSMENT DOCUMENTED: ICD-10-PCS | Mod: ,,, | Performed by: FAMILY MEDICINE

## 2022-12-08 PROCEDURE — 3008F PR BODY MASS INDEX (BMI) DOCUMENTED: ICD-10-PCS | Mod: ,,, | Performed by: FAMILY MEDICINE

## 2022-12-08 PROCEDURE — 99214 PR OFFICE/OUTPT VISIT, EST, LEVL IV, 30-39 MIN: ICD-10-PCS | Mod: ,,, | Performed by: FAMILY MEDICINE

## 2022-12-08 PROCEDURE — 3078F DIAST BP <80 MM HG: CPT | Mod: ,,, | Performed by: FAMILY MEDICINE

## 2022-12-08 PROCEDURE — 1159F PR MEDICATION LIST DOCUMENTED IN MEDICAL RECORD: ICD-10-PCS | Mod: ,,, | Performed by: FAMILY MEDICINE

## 2022-12-08 PROCEDURE — 93005 POCT EKG 12-LEAD: ICD-10-PCS | Mod: ,,, | Performed by: FAMILY MEDICINE

## 2022-12-08 PROCEDURE — 1100F PR PT FALLS ASSESS DOC 2+ FALLS/FALL W/INJURY/YR: ICD-10-PCS | Mod: ,,, | Performed by: FAMILY MEDICINE

## 2022-12-08 PROCEDURE — 1159F MED LIST DOCD IN RCRD: CPT | Mod: ,,, | Performed by: FAMILY MEDICINE

## 2022-12-08 PROCEDURE — 80048 BASIC METABOLIC PANEL: ICD-10-PCS | Mod: ,,, | Performed by: CLINICAL MEDICAL LABORATORY

## 2022-12-08 PROCEDURE — 3074F SYST BP LT 130 MM HG: CPT | Mod: ,,, | Performed by: FAMILY MEDICINE

## 2022-12-08 PROCEDURE — 99214 OFFICE O/P EST MOD 30 MIN: CPT | Mod: ,,, | Performed by: FAMILY MEDICINE

## 2022-12-08 PROCEDURE — 80048 BASIC METABOLIC PNL TOTAL CA: CPT | Mod: ,,, | Performed by: CLINICAL MEDICAL LABORATORY

## 2022-12-08 PROCEDURE — 3078F PR MOST RECENT DIASTOLIC BLOOD PRESSURE < 80 MM HG: ICD-10-PCS | Mod: ,,, | Performed by: FAMILY MEDICINE

## 2022-12-08 PROCEDURE — 3074F PR MOST RECENT SYSTOLIC BLOOD PRESSURE < 130 MM HG: ICD-10-PCS | Mod: ,,, | Performed by: FAMILY MEDICINE

## 2022-12-08 PROCEDURE — 93005 ELECTROCARDIOGRAM TRACING: CPT | Mod: ,,, | Performed by: FAMILY MEDICINE

## 2022-12-08 RX ORDER — FERROUS SULFATE 325(65) MG
325 TABLET, DELAYED RELEASE (ENTERIC COATED) ORAL 2 TIMES DAILY
Qty: 60 TABLET | Refills: 11 | Status: SHIPPED | OUTPATIENT
Start: 2022-12-08 | End: 2023-07-13 | Stop reason: SDUPTHER

## 2022-12-08 RX ORDER — CYPROHEPTADINE HYDROCHLORIDE 4 MG/1
4 TABLET ORAL 3 TIMES DAILY PRN
Qty: 60 TABLET | Refills: 0 | Status: SHIPPED | OUTPATIENT
Start: 2022-12-08 | End: 2023-04-06 | Stop reason: SDUPTHER

## 2022-12-08 NOTE — PROGRESS NOTES
Vicente Guzmán MD   [facility]  [address] [place] [address]     PATIENT NAME: Anabelle Gaspar  : 1955  DATE: 22  MRN: [id number]      Billing Provider: Vicente Guzmán MD  Level of Service:   Patient PCP Information       Provider PCP Type    Vicente Guzmán MD General            Reason for Visit / Chief Complaint: Dizziness (Monday night with waking at night, patient reports after urinating, fainting. Fall on Monday night and last night x 3. Patient reports hitting head on left side on tub last night. Feeling as if not having any energy today. ), Anorexia (Poor appetite. Asking for medication that will help with appetite. ), and Loss of Consciousness (Syncope with falling on Monday night and last night. )         History of Present Illness / Problem Focused Workflow     Anabelle Gaspar presents to the clinic with Dizziness (Monday night with waking at night, patient reports after urinating, fainting. Fall on Monday night and last night x 3. Patient reports hitting head on left side on tub last night. Feeling as if not having any energy today. ), Anorexia (Poor appetite. Asking for medication that will help with appetite. ), and Loss of Consciousness (Syncope with falling on Monday night and last night. )     HPI    Review of Systems     Review of Systems   Constitutional:  Positive for fatigue. Negative for activity change, appetite change and fever.   HENT:  Negative for nasal congestion, ear pain, hearing loss, sinus pressure/congestion and sore throat.    Respiratory:  Negative for cough, chest tightness and shortness of breath.    Cardiovascular:  Negative for chest pain and palpitations.   Gastrointestinal:  Negative for abdominal pain and fecal incontinence.   Genitourinary:  Negative for bladder incontinence and difficulty urinating.   Musculoskeletal:  Negative for arthralgias.   Integumentary:  Negative for rash.   Neurological:  Positive for  syncope. Negative for dizziness and headaches.      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     HYSTERECTOMY      NECK SURGERY      SINUS SURGERY         Social History  Anabelle Gaspar  reports that she has been smoking cigarettes. She started smoking about 50 years ago. She has been smoking an average of 2 packs per day. She has never used smokeless tobacco. She reports that she does not currently use alcohol. She reports that she does not use drugs.    Family History  Anabelle Gaspar  family history includes Cancer in her sister; Diabetes in her mother and sister; Heart disease in her mother; Hyperlipidemia in her mother and sister; Hypertension in her mother and sister.    Medications and Allergies     Medications  Outpatient Medications Marked as Taking for the 12/8/22 encounter (Office Visit) with Vicente Guzmán MD   Medication Sig Dispense Refill    amitriptyline (ELAVIL) 100 MG tablet Take 100 mg by mouth nightly.      aspirin (ECOTRIN) 81 MG EC tablet Take 81 mg by mouth once daily.      butalbitaL-acetaminophen  mg Tab TAKE 1 OR 2 TABLETS BY MOUTH EVERY 4 HOURS AS NEEDED FOR PAIN      CALCIUM CITRATE ORAL Take 1 tablet by mouth once daily.      clopidogreL (PLAVIX) 75 mg tablet Take 75 mg by mouth once daily.      diltiaZEM (CARDIZEM CD) 120 MG Cp24 TAKE 1 CAPSULE BY MOUTH EVERYDAY FOR BLOOD PRESSURE.      docusate sodium (COLACE) 100 MG capsule Take 100 mg by mouth 2 (two) times daily.      DULoxetine (CYMBALTA) 60 MG capsule Take 60 mg by mouth 2 (two) times daily.      ergocalciferol, vitamin D2, (VITAMIN D ORAL) Take 1 tablet by mouth once daily.      ferrous sulfate  325 (65 FE) MG EC tablet Take 1 tablet (325 mg total) by mouth 2 (two) times daily. 60 tablet 11    furosemide (LASIX) 80 MG tablet Take 80 mg by mouth 2 (two) times a day.      gabapentin (NEURONTIN) 300 MG capsule Take 300 mg by mouth 3 (three) times daily.      hydrALAZINE (APRESOLINE) 10 MG tablet Take 10 mg by mouth 3 (three) times daily.      HYDROcodone-acetaminophen (NORCO)  mg per tablet TAKE 1 TABLET BY MOUTH 4 TIMES A DAY IF NEEDED FOR PAIN.      isosorbide mononitrate (IMDUR) 60 MG 24 hr tablet Take 60 mg by mouth 2 (two) times daily.      levothyroxine (SYNTHROID) 50 MCG tablet TAKE 1 TABLET BEFORE BREAKFAST 90 tablet 1    methocarbamoL (ROBAXIN) 750 MG Tab Take 750 mg by mouth 3 (three) times daily as needed.      metOLazone (ZAROXOLYN) 2.5 MG tablet Take 2.5 mg by mouth daily as needed. Take as needed for a weight gain of 3+      multivitamin (THERAGRAN) per tablet Take 1 tablet by mouth once daily.      nitroGLYCERIN (NITROSTAT) 0.4 MG SL tablet Place 0.4 mg under the tongue every 5 (five) minutes as needed for Chest pain.      paroxetine (PAXIL) 40 MG tablet TAKE 1 TABLET ONE TIME DAILY 90 tablet 0    potassium chloride SA (K-DUR,KLOR-CON) 20 MEQ tablet Take 1 tablet by mouth 2 (two) times a day.      promethazine (PHENERGAN) 25 MG tablet TAKE 1 TABLET BY MOUTH EVERYDAY IF NEEDED      tamsulosin (FLOMAX) 0.4 mg Cap Take 0.4 mg by mouth once daily.      topiramate (TOPAMAX) 200 MG Tab Take 200 mg by mouth nightly.      [DISCONTINUED] ferrous sulfate (FEOSOL) Tab tablet Take 1 tablet by mouth 2 (two) times daily.         Allergies  Review of patient's allergies indicates:   Allergen Reactions    Levaquin [levofloxacin] Swelling    Dexamethasone Edema and Other (See Comments)    Prednisone Other (See Comments)       Physical Examination     Vitals:    12/08/22 1513   BP: (!) 110/46   Pulse: 92   Temp:      Physical Exam  Constitutional:       General: She is not in acute  distress.     Appearance: She is not ill-appearing.   HENT:      Head: Normocephalic and atraumatic.      Right Ear: Tympanic membrane and ear canal normal.      Left Ear: Tympanic membrane and ear canal normal.      Nose: Nose normal. No congestion or rhinorrhea.   Eyes:      Pupils: Pupils are equal, round, and reactive to light.   Cardiovascular:      Rate and Rhythm: Normal rate and regular rhythm.      Pulses: Normal pulses.      Heart sounds: No murmur heard.  Pulmonary:      Effort: No respiratory distress.      Breath sounds: No wheezing, rhonchi or rales.   Abdominal:      General: Bowel sounds are normal.      Palpations: Abdomen is soft.      Tenderness: There is no abdominal tenderness.      Hernia: No hernia is present.   Musculoskeletal:      Cervical back: Normal range of motion and neck supple.   Lymphadenopathy:      Cervical: No cervical adenopathy.   Skin:     General: Skin is warm and dry.      Coloration: Skin is pale.   Neurological:      Mental Status: She is alert.   Psychiatric:         Behavior: Behavior normal.         Thought Content: Thought content normal.        Assessment and Plan (including Health Maintenance)   :    Plan:         Health Maintenance Due   Topic Date Due    Hepatitis C Screening  Never done    Pneumococcal Vaccines (Age 65+) (1 - PCV) Never done    HIV Screening  Never done    TETANUS VACCINE  Never done    DEXA Scan  Never done    Shingles Vaccine (1 of 2) Never done    Pap Smear  01/29/2017    COVID-19 Vaccine (2 - Moderna series) 09/14/2021    Influenza Vaccine (1) 09/01/2022       Problem List Items Addressed This Visit          Oncology    Anemia     Other Visit Diagnoses       Syncope, unspecified syncope type    -  Primary    Relevant Orders    CBC Auto Differential    Basic Metabolic Panel    POCT EKG 12-LEAD (NOT FOR [facility] USE) (Completed)          Syncope, unspecified syncope type  -     CBC Auto Differential; Future; Expected date: 12/08/2022  -      Basic Metabolic Panel; Future; Expected date: 12/08/2022  -     POCT EKG 12-LEAD (NOT FOR [facility] USE)    Anemia, unspecified type    Other orders  -     cyproheptadine (PERIACTIN) 4 mg tablet; Take 1 tablet (4 mg total) by mouth 3 (three) times daily as needed.  Dispense: 60 tablet; Refill: 0  -     ferrous sulfate 325 (65 FE) MG EC tablet; Take 1 tablet (325 mg total) by mouth 2 (two) times daily.  Dispense: 60 tablet; Refill: 11     Health Maintenance Topics with due status: Not Due       Topic Last Completion Date    Colorectal Cancer Screening 07/03/2019    Lipid Panel 10/26/2021    Mammogram 07/28/2022       Procedures     Future Appointments   Date Time Provider Department Center   12/13/2022  2:00 PM Kellie Concepcion PTA [facility]   12/15/2022  2:00 PM Mark Khanna PTA [facility]   12/20/2022  2:00 PM Mark Khanna [facility]   [facility]  [facility]        No follow-ups on file.       Signature:  Vicente Guzmán MD  [facility]  [address] [place] [address]  [phone] Phone  [phone] Fax    Date of encounter: 12/8/22

## 2022-12-09 LAB
BASOPHILS # BLD AUTO: 0.04 K/UL (ref 0–0.2)
BASOPHILS NFR BLD AUTO: 0.5 % (ref 0–1)
DIFFERENTIAL METHOD BLD: ABNORMAL
EOSINOPHIL # BLD AUTO: 0.04 K/UL (ref 0–0.5)
EOSINOPHIL NFR BLD AUTO: 0.5 % (ref 1–4)
ERYTHROCYTE [DISTWIDTH] IN BLOOD BY AUTOMATED COUNT: 18.5 % (ref 11.5–14.5)
HCT VFR BLD AUTO: 22.9 % (ref 38–47)
HGB BLD-MCNC: 7.1 G/DL (ref 12–16)
IMM GRANULOCYTES # BLD AUTO: 0.04 K/UL (ref 0–0.04)
IMM GRANULOCYTES NFR BLD: 0.5 % (ref 0–0.4)
LYMPHOCYTES # BLD AUTO: 2.08 K/UL (ref 1–4.8)
LYMPHOCYTES NFR BLD AUTO: 24.7 % (ref 27–41)
MCH RBC QN AUTO: 30.3 PG (ref 27–31)
MCHC RBC AUTO-ENTMCNC: 31 G/DL (ref 32–36)
MCV RBC AUTO: 97.9 FL (ref 80–96)
MONOCYTES # BLD AUTO: 0.89 K/UL (ref 0–0.8)
MONOCYTES NFR BLD AUTO: 10.6 % (ref 2–6)
MPC BLD CALC-MCNC: 10 FL (ref 9.4–12.4)
NEUTROPHILS # BLD AUTO: 5.33 K/UL (ref 1.8–7.7)
NEUTROPHILS NFR BLD AUTO: 63.2 % (ref 53–65)
NRBC # BLD AUTO: 0.02 X10E3/UL
NRBC, AUTO (.00): 0.2 %
PLATELET # BLD AUTO: 413 K/UL (ref 150–400)
RBC # BLD AUTO: 2.34 M/UL (ref 4.2–5.4)
WBC # BLD AUTO: 8.42 K/UL (ref 4.5–11)

## 2022-12-13 ENCOUNTER — DOCUMENTATION ONLY (OUTPATIENT)
Dept: REHABILITATION | Facility: HOSPITAL | Age: 67
End: 2022-12-13
Payer: MEDICARE

## 2022-12-13 NOTE — PROGRESS NOTES
Outpatient Therapy Discharge Summary   Name: Anabelle Gaspar  Clinic Number: [id number]     Therapy Diagnosis:        Encounter Diagnosis   Name Primary?    Right hip pain Yes      Physician: Jazzy Morris*     Visit Date: 11/29/2022     Physician Orders: PT Eval and Treat   Medical Diagnosis from Referral: s/p R femur ORIF   Evaluation Date: 11/10/2022  Date of Last visit: 11/29/2022  Total Visits Received: 5  Cancelled Visits: 3  No Show Visits: 2    Assessment    Goals:  Patient will have increased R LE muscle strength to 4/5 for increased independence with tranfers. -Unable to assess due to patient not returning to PT.  Patient will complete 5 x sit<>stand with no UE use in less than or equal to 15 seconds for increased functional independence. -Unable to assess due to patient not returning to PT.  Patient will ambulate community distance with a straight cane or no AD with gait speed greater than or equal to 2.75 feet per second for increased safety with community ambulation -Unable to assess due to patient not returning to PT.  Patient will report decreased R hip pain to less than or equal to 4/10 for improved quality of life. -Unable to assess due to patient not returning to PT.    Discharge reason: Patient has not attended therapy since 11/29/2022    Plan   This patient is discharged from Physical Therapy.   Mandi Burk, PT, DPT

## 2022-12-14 DIAGNOSIS — D64.9 ANEMIA, UNSPECIFIED TYPE: Primary | ICD-10-CM

## 2022-12-21 ENCOUNTER — HOSPITAL ENCOUNTER (EMERGENCY)
Facility: HOSPITAL | Age: 67
Discharge: ANOTHER HOSPITAL | End: 2022-12-21
Attending: EMERGENCY MEDICINE
Payer: MEDICARE

## 2022-12-21 VITALS
HEIGHT: 64 IN | WEIGHT: 127.13 LBS | BODY MASS INDEX: 21.71 KG/M2 | HEART RATE: 88 BPM | OXYGEN SATURATION: 99 % | DIASTOLIC BLOOD PRESSURE: 56 MMHG | SYSTOLIC BLOOD PRESSURE: 121 MMHG | TEMPERATURE: 98 F | RESPIRATION RATE: 22 BRPM

## 2022-12-21 DIAGNOSIS — R06.02 SHORTNESS OF BREATH: ICD-10-CM

## 2022-12-21 DIAGNOSIS — I24.9 ACUTE CORONARY SYNDROME WITH HIGH TROPONIN: Primary | ICD-10-CM

## 2022-12-21 DIAGNOSIS — D64.9 CHRONIC ANEMIA: ICD-10-CM

## 2022-12-21 LAB
ALBUMIN SERPL BCP-MCNC: 3.2 G/DL (ref 3.5–5)
ALBUMIN/GLOB SERPL: 0.8 {RATIO}
ALP SERPL-CCNC: 138 U/L (ref 55–142)
ALT SERPL W P-5'-P-CCNC: 27 U/L (ref 13–56)
ANION GAP SERPL CALCULATED.3IONS-SCNC: 15 MMOL/L (ref 7–16)
APTT PPP: 31.2 SECONDS (ref 25.2–37.3)
AST SERPL W P-5'-P-CCNC: 21 U/L (ref 15–37)
BASOPHILS # BLD AUTO: 0.03 K/UL (ref 0–0.2)
BASOPHILS NFR BLD AUTO: 0.6 % (ref 0–1)
BILIRUB SERPL-MCNC: 0.3 MG/DL (ref ?–1.2)
BILIRUB UR QL STRIP: NEGATIVE
BUN SERPL-MCNC: 15 MG/DL (ref 7–18)
BUN/CREAT SERPL: 15 (ref 6–20)
CALCIUM SERPL-MCNC: 8.2 MG/DL (ref 8.5–10.1)
CHLORIDE SERPL-SCNC: 101 MMOL/L (ref 98–107)
CLARITY UR: CLEAR
CO2 SERPL-SCNC: 25 MMOL/L (ref 21–32)
COLOR UR: YELLOW
CREAT SERPL-MCNC: 1.02 MG/DL (ref 0.55–1.02)
D DIMER PPP FEU-MCNC: 2.87 ΜG/ML (ref 0–0.47)
DIFFERENTIAL METHOD BLD: ABNORMAL
EGFR (NO RACE VARIABLE) (RUSH/TITUS): 61 ML/MIN/1.73M²
EOSINOPHIL # BLD AUTO: 0.07 K/UL (ref 0–0.5)
EOSINOPHIL NFR BLD AUTO: 1.3 % (ref 1–4)
ERYTHROCYTE [DISTWIDTH] IN BLOOD BY AUTOMATED COUNT: 20.1 % (ref 11.5–14.5)
GLOBULIN SER-MCNC: 4.1 G/DL (ref 2–4)
GLUCOSE SERPL-MCNC: 132 MG/DL (ref 74–106)
GLUCOSE UR STRIP-MCNC: NEGATIVE MG/DL
HCT VFR BLD AUTO: 30.2 % (ref 38–47)
HGB BLD-MCNC: 8.8 G/DL (ref 12–16)
IMM GRANULOCYTES # BLD AUTO: 0.01 K/UL (ref 0–0.04)
IMM GRANULOCYTES NFR BLD: 0.2 % (ref 0–0.4)
INR BLD: 0.97
KETONES UR STRIP-SCNC: NEGATIVE MG/DL
LEUKOCYTE ESTERASE UR QL STRIP: NEGATIVE
LYMPHOCYTES # BLD AUTO: 1.3 K/UL (ref 1–4.8)
LYMPHOCYTES NFR BLD AUTO: 24.6 % (ref 27–41)
MACROCYTES BLD QL SMEAR: NORMAL
MCH RBC QN AUTO: 31 PG (ref 27–31)
MCHC RBC AUTO-ENTMCNC: 29.1 G/DL (ref 32–36)
MCV RBC AUTO: 106.3 FL (ref 80–96)
MONOCYTES # BLD AUTO: 0.56 K/UL (ref 0–0.8)
MONOCYTES NFR BLD AUTO: 10.6 % (ref 2–6)
MPC BLD CALC-MCNC: 9.1 FL (ref 9.4–12.4)
NEUTROPHILS # BLD AUTO: 3.31 K/UL (ref 1.8–7.7)
NEUTROPHILS NFR BLD AUTO: 62.7 % (ref 53–65)
NITRITE UR QL STRIP: NEGATIVE
NT-PROBNP SERPL-MCNC: ABNORMAL PG/ML (ref 1–125)
OCCULT BLOOD: NEGATIVE
PH UR STRIP: 6 PH UNITS
PLATELET # BLD AUTO: 397 K/UL (ref 150–400)
PLATELET MORPHOLOGY: NORMAL
POTASSIUM SERPL-SCNC: 3.5 MMOL/L (ref 3.5–5.1)
PROT SERPL-MCNC: 7.3 G/DL (ref 6.4–8.2)
PROT UR QL STRIP: NEGATIVE
PROTHROMBIN TIME: 12.9 SECONDS (ref 11.7–14.7)
RBC # BLD AUTO: 2.84 M/UL (ref 4.2–5.4)
RBC # UR STRIP: NEGATIVE /UL
SODIUM SERPL-SCNC: 137 MMOL/L (ref 136–145)
SP GR UR STRIP: 1.01
TROPONIN I SERPL HS-MCNC: 447 PG/ML
TROPONIN I SERPL HS-MCNC: 468.1 PG/ML
UROBILINOGEN UR STRIP-ACNC: 0.2 MG/DL
WBC # BLD AUTO: 5.28 K/UL (ref 4.5–11)

## 2022-12-21 PROCEDURE — 85025 COMPLETE CBC W/AUTO DIFF WBC: CPT | Performed by: EMERGENCY MEDICINE

## 2022-12-21 PROCEDURE — 82272 OCCULT BLD FECES 1-3 TESTS: CPT | Performed by: EMERGENCY MEDICINE

## 2022-12-21 PROCEDURE — 93010 EKG 12-LEAD: ICD-10-PCS | Mod: ,,, | Performed by: INTERNAL MEDICINE

## 2022-12-21 PROCEDURE — 94760 N-INVAS EAR/PLS OXIMETRY 1: CPT

## 2022-12-21 PROCEDURE — 85379 FIBRIN DEGRADATION QUANT: CPT | Performed by: EMERGENCY MEDICINE

## 2022-12-21 PROCEDURE — 63600175 PHARM REV CODE 636 W HCPCS: Performed by: EMERGENCY MEDICINE

## 2022-12-21 PROCEDURE — 85610 PROTHROMBIN TIME: CPT | Performed by: EMERGENCY MEDICINE

## 2022-12-21 PROCEDURE — 99285 EMERGENCY DEPT VISIT HI MDM: CPT | Mod: 25

## 2022-12-21 PROCEDURE — 25500020 PHARM REV CODE 255: Performed by: EMERGENCY MEDICINE

## 2022-12-21 PROCEDURE — 84484 ASSAY OF TROPONIN QUANT: CPT | Performed by: EMERGENCY MEDICINE

## 2022-12-21 PROCEDURE — 85730 THROMBOPLASTIN TIME PARTIAL: CPT | Performed by: EMERGENCY MEDICINE

## 2022-12-21 PROCEDURE — 80053 COMPREHEN METABOLIC PANEL: CPT | Performed by: EMERGENCY MEDICINE

## 2022-12-21 PROCEDURE — 99285 EMERGENCY DEPT VISIT HI MDM: CPT | Performed by: EMERGENCY MEDICINE

## 2022-12-21 PROCEDURE — 96361 HYDRATE IV INFUSION ADD-ON: CPT

## 2022-12-21 PROCEDURE — 96375 TX/PRO/DX INJ NEW DRUG ADDON: CPT | Mod: 59

## 2022-12-21 PROCEDURE — 96374 THER/PROPH/DIAG INJ IV PUSH: CPT

## 2022-12-21 PROCEDURE — 93010 ELECTROCARDIOGRAM REPORT: CPT | Mod: ,,, | Performed by: INTERNAL MEDICINE

## 2022-12-21 PROCEDURE — 93005 ELECTROCARDIOGRAM TRACING: CPT

## 2022-12-21 PROCEDURE — 27000221 HC OXYGEN, UP TO 24 HOURS

## 2022-12-21 PROCEDURE — 25000003 PHARM REV CODE 250: Performed by: EMERGENCY MEDICINE

## 2022-12-21 PROCEDURE — 83880 ASSAY OF NATRIURETIC PEPTIDE: CPT | Performed by: EMERGENCY MEDICINE

## 2022-12-21 PROCEDURE — 81003 URINALYSIS AUTO W/O SCOPE: CPT | Performed by: EMERGENCY MEDICINE

## 2022-12-21 RX ORDER — ASPIRIN 325 MG
325 TABLET ORAL
Status: COMPLETED | OUTPATIENT
Start: 2022-12-21 | End: 2022-12-21

## 2022-12-21 RX ORDER — DIPHENHYDRAMINE HYDROCHLORIDE 50 MG/ML
6.25 INJECTION INTRAMUSCULAR; INTRAVENOUS
Status: COMPLETED | OUTPATIENT
Start: 2022-12-21 | End: 2022-12-21

## 2022-12-21 RX ORDER — SODIUM CHLORIDE 9 MG/ML
1000 INJECTION, SOLUTION INTRAVENOUS
Status: DISCONTINUED | OUTPATIENT
Start: 2022-12-21 | End: 2022-12-22 | Stop reason: HOSPADM

## 2022-12-21 RX ORDER — FUROSEMIDE 10 MG/ML
40 INJECTION INTRAMUSCULAR; INTRAVENOUS
Status: COMPLETED | OUTPATIENT
Start: 2022-12-21 | End: 2022-12-21

## 2022-12-21 RX ADMIN — FUROSEMIDE 40 MG: 10 INJECTION, SOLUTION INTRAVENOUS at 07:12

## 2022-12-21 RX ADMIN — DIPHENHYDRAMINE HYDROCHLORIDE 6.25 MG: 50 INJECTION, SOLUTION INTRAMUSCULAR; INTRAVENOUS at 08:12

## 2022-12-21 RX ADMIN — ASPIRIN 325 MG ORAL TABLET 325 MG: 325 PILL ORAL at 04:12

## 2022-12-21 RX ADMIN — SODIUM CHLORIDE 500 ML: 9 INJECTION, SOLUTION INTRAVENOUS at 06:12

## 2022-12-21 RX ADMIN — IOPAMIDOL 100 ML: 755 INJECTION, SOLUTION INTRAVENOUS at 06:12

## 2022-12-21 RX ADMIN — TICAGRELOR 180 MG: 90 TABLET ORAL at 04:12

## 2022-12-21 NOTE — ED PROVIDER NOTES
Encounter Date: 12/21/2022       History     Chief Complaint   Patient presents with    Shortness of Breath     Patient presents with report of shortness of breath for the past couple of weeks.  Patient was sent to the emergency department from primary care physician's office where she went today for evaluation of progressively worsening shortness of breath.  It was noted that the patient had blood tests done in the office on December 8th and December 12th, of this year which showed anemia with hemoglobin and hematocrit in the range of 7 and 23.  Patient states she last had a colonoscopy about 10 years ago which was normal.  She has not seen any blood in her stool.  She had hip surgery in October of this year and was anemic perioperatively, was given 3 units of blood.  And placed on iron supplementation.    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     HYSTERECTOMY      NECK SURGERY      SINUS SURGERY      STENTS      X 4     Family History   Problem Relation Age of Onset    Diabetes Mother     Heart disease Mother     Hyperlipidemia Mother     Hypertension Mother     Diabetes Sister     Hyperlipidemia Sister     Hypertension Sister     Cancer Sister      Social History     Tobacco Use    Smoking status: Every Day     Packs/day: 2.00     Types: Cigarettes     Start date: 1972    Smokeless tobacco: Never   Substance Use Topics    Alcohol use: Not Currently    Drug use: Never     Review of Systems    Constitutional:  Positive for fatigue. Negative for activity change, appetite change, chills, diaphoresis and fever.   HENT: Negative.     Eyes: Negative.    Respiratory: Negative.     Cardiovascular: Negative.    Gastrointestinal: Negative.  Negative for anal bleeding and blood in stool.   Genitourinary: Negative.  Negative for hematuria and vaginal bleeding.   Musculoskeletal: Negative.    Skin: Negative.    Neurological:  Positive for light-headedness. Negative for dizziness, tremors, seizures, syncope, facial asymmetry, speech difficulty, weakness, numbness and headaches.   Hematological:  Negative for adenopathy. Does not bruise/bleed easily.        History of anemia.   Psychiatric/Behavioral: Negative.     All other systems reviewed and are negative.    Physical Exam     Initial Vitals [12/21/22 1527]   BP Pulse Resp Temp SpO2   129/61 94 (!) 24 97.8 °F (36.6 °C) 98 %      MAP       --         Physical Exam    Nursing note and vitals reviewed.  Constitutional: She appears well-developed and well-nourished.   HENT:   Head: Normocephalic.   Right Ear: External ear normal.   Left Ear: External ear normal.   Nose: Nose normal.   Mouth/Throat: Oropharynx is clear and moist. No oropharyngeal exudate.   Eyes: Conjunctivae and EOM are normal. Pupils are equal, round, and reactive to light. Right eye exhibits no discharge. Left eye exhibits no discharge.   Neck: Neck supple. No JVD present.   Normal range of motion.  Cardiovascular:  Normal rate, regular rhythm and intact distal pulses.           Murmur (2/6 systolic murmur heard best at the lower left sternal border.) heard.  Pulmonary/Chest: No stridor. No respiratory distress. She has no wheezes. She has no rhonchi. She has no rales.   Is a rub or slight end inhalational squeaking type noise heard in the right mid lung field.   Musculoskeletal:         General: No tenderness or edema. Normal range of motion.      Cervical back: Normal range of motion and neck  supple.     Lymphadenopathy:     She has no cervical adenopathy.   Neurological: She is alert and oriented to person, place, and time. She has normal strength. No cranial nerve deficit. GCS score is 15. GCS eye subscore is 4. GCS verbal subscore is 5. GCS motor subscore is 6.   Skin: Skin is warm and dry. Capillary refill takes less than 2 seconds. No rash noted. No erythema. No pallor.   Psychiatric: Her behavior is normal.       Medical Screening Exam   See Full Note    ED Course   Procedures  Labs Reviewed   COMPREHENSIVE METABOLIC PANEL - Abnormal; Notable for the following components:       Result Value    Glucose 132 (*)     Calcium 8.2 (*)     Albumin 3.2 (*)     Globulin 4.1 (*)     All other components within normal limits   CBC WITH DIFFERENTIAL - Abnormal; Notable for the following components:    RBC 2.84 (*)     Hemoglobin 8.8 (*)     Hematocrit 30.2 (*)     .3 (*)     MCHC 29.1 (*)     RDW 20.1 (*)     MPV 9.1 (*)     Lymphocytes % 24.6 (*)     Monocytes % 10.6 (*)     All other components within normal limits   TROPONIN I - Abnormal; Notable for the following components:    Troponin I High Sensitivity 447.0 (*)     All other components within normal limits   D DIMER, QUANTITATIVE - Abnormal; Notable for the following components:    D-Dimer 2.87 (*)     All other components within normal limits   NT-PRO NATRIURETIC PEPTIDE - Abnormal; Notable for the following components:    ProBNP 12,037 (*)     All other components within normal limits   TROPONIN I - Abnormal; Notable for the following components:    Troponin I High Sensitivity 468.1 (*)     All other components within normal limits   APTT - Normal   PROTIME-INR - Normal   OCCULT BLOOD X 1, STOOL - Normal   CBC W/ AUTO DIFFERENTIAL    Narrative:     The following orders were created for panel order CBC auto differential.  Procedure                               Abnormality         Status                     ---------                                -----------         ------                     CBC with Differential[[id number]]        Abnormal            Final result                 Please view results for these tests on the individual orders.   CBC MORPHOLOGY   URINALYSIS, REFLEX TO URINE CULTURE        ECG Results              EKG 12-lead (In process)  Result time 12/21/22 15:49:25      In process by Interface, Lab In [facility] (12/21/22 15:49:25)                   Narrative:    Test Reason : R06.02,    Vent. Rate : 093 BPM     Atrial Rate : 093 BPM     P-R Int : 166 ms          QRS Dur : 100 ms      QT Int : 398 ms       P-R-T Axes : 060 100 -58 degrees     QTc Int : 494 ms    Normal sinus rhythm  Rightward axis  ST and T wave abnormality, consider inferior ischemia  Prolonged QT  Abnormal ECG  No previous ECGs available    Referred By: [email or web address]   SELF           Confirmed By:                                   Imaging Results              CTA Chest Non-Coronary (PE Studies) (Final result)  Result time 12/21/22 18:50:45      Final result by Imer Baez II, MD (12/21/22 18:50:45)                   Impression:      No evidence of pulmonary thromboembolism.  Small effusions and small amounts of dependent airspace density likely atelectasis.  No other acute findings..      Electronically signed by: mIer Baez  Date:    12/21/2022  Time:    18:50               Narrative:    EXAMINATION:  CTA CHEST NON CORONARY (PE STUDIES)    CLINICAL HISTORY:  Pulmonary embolism (PE) suspected, positive D-dimer;    TECHNIQUE:  Axial CT imaging of the chest is performed with intravenous contrast. Contrast dose is 100 cc Isovue 370.    CT dose reduction technique used - Dose Rite and tube current modulation.    COMPARISON:  None available    FINDINGS:  No thrombus or other abnormality is identified in the pulmonary arteries or veins.  The pulmonary vessel caliber is within normal limits.  The heart, mediastinum and great vessels appear within normal limits.    Small  bilateral pleural effusions present.  Small amount hazy airspace density present in the dependent lower lobes.  Chronic lung changes.  Otherwise the pulmonary parenchyma shows no evidence of airspace disease or abnormal density.  No effusion or pneumothorax is present.                                       X-Ray Chest PA And Lateral (Final result)  Result time 12/21/22 16:11:51      Final result by Phi Huffman DO (12/21/22 16:11:51)                   Impression:      Borderline cardiomegaly.  Trace bilateral pleural effusions.  No demetra pulmonary edema or focal consolidating pneumonia.    Point of Service: [facility]      Electronically signed by: Phi Huffman  Date:    12/21/2022  Time:    16:11               Narrative:    EXAMINATION:  XR CHEST PA AND LATERAL    CLINICAL HISTORY:  Shortness of breath    COMPARISON:  Chest x-ray July 18, 2017    TECHNIQUE:  Frontal and lateral views of the chest.    FINDINGS:  Borderline cardiomegaly.  Prior sternotomy.  Trace bilateral pleural effusions.  No demetra pulmonary edema or focal consolidating pneumonia.  Lower cervical fusion hardware.                                       Medications   0.9%  NaCl infusion (has no administration in time range)   aspirin tablet 325 mg (325 mg Oral Given 12/21/22 1641)   ticagrelor tablet 180 mg (180 mg Oral Given 12/21/22 1641)   sodium chloride 0.9% bolus 500 mL 500 mL (0 mLs Intravenous Stopped 12/21/22 1920)   iopamidoL (ISOVUE-370) injection 100 mL (100 mLs Intravenous Given 12/21/22 1840)   furosemide injection 40 mg (40 mg Intravenous Given 12/21/22 1935)   diphenhydrAMINE injection 6.25 mg (6.25 mg Intravenous Given 12/21/22 2002)     Medical Decision Making:   Clinical Tests:   Lab Tests: Reviewed       <> Summary of Lab: Patient has an elevated troponin, will treat as acute coronary syndrome and recommend transfer to higher level of care for evaluation by Cardiology.  Her H&H is low but is improved from previous  level on 12/12/2022.  Radiological Study: Reviewed  Other:   I have discussed this case with another health care provider.       <> Summary of the Discussion: Patient discussed with Dr. Quintanilla for cardiology at [facility], accepted in transfer, pending bed assignment. radiologist's report for PA lateral chest x-ray, indicates small bilateral pleural effusions, no acute process seen otherwise.  Reviewed radiologist report for CT PE study, no evidence of pulmonary embolism, no abnormal lung density noted.  Small bilateral pleural effusions noted.                 Clinical Impression:   Final diagnoses:  [R06.02] Shortness of breath  [I24.9] Acute coronary syndrome with high troponin (Primary)  [D64.9] Chronic anemia        ED Disposition Condition    Transfer to Another Facility Deep Madrigal DO  12/21/22 2048

## 2022-12-21 NOTE — ED TRIAGE NOTES
PATIENT PRESENTED TO ER WITH C/O SOB X 1 WEEK; PATIENT SEEN BY DR. VERDUZCO 2 WEEKS AGO WITH HEMOGLOBIN OF 7.0; REPEAT LAB LAST Monday WITH NO CHANGE IN RESULT; DR. VERDUZCO RECOMMENDED COMING TO ER DUE TO INCREASE IN SOB; NO CHANGE IN HEMOGLOBIN & POSSIBLE TRANSFUSION

## 2023-01-09 DIAGNOSIS — Z71.89 COMPLEX CARE COORDINATION: ICD-10-CM

## 2023-01-24 RX ORDER — ZOLPIDEM TARTRATE 10 MG/1
10 TABLET ORAL NIGHTLY PRN
Qty: 30 TABLET | Refills: 0 | Status: SHIPPED | OUTPATIENT
Start: 2023-01-24 | End: 2023-02-16

## 2023-01-24 NOTE — TELEPHONE ENCOUNTER
Spoke with patient regarding Ambien. Patient continues to take Ambien for sleep. Unsure of reasoning Ambien was discharged off of med list.

## 2023-01-24 NOTE — TELEPHONE ENCOUNTER
----- Message from Denise Valdez sent at 1/23/2023  1:50 PM CST -----  Contact: Sydni Troy called for a refill on zolpidem  [phone] RX can be e-scribed,faxed or called in

## 2023-02-06 PROBLEM — N17.9 ACUTE KIDNEY FAILURE: Status: RESOLVED | Noted: 2022-11-07 | Resolved: 2023-02-06

## 2023-02-06 PROBLEM — N17.9 AKI (ACUTE KIDNEY INJURY): Status: RESOLVED | Noted: 2022-11-07 | Resolved: 2023-02-06

## 2023-04-06 ENCOUNTER — OFFICE VISIT (OUTPATIENT)
Dept: FAMILY MEDICINE | Facility: CLINIC | Age: 68
End: 2023-04-06
Payer: MEDICARE

## 2023-04-06 VITALS
OXYGEN SATURATION: 97 % | HEART RATE: 82 BPM | TEMPERATURE: 99 F | SYSTOLIC BLOOD PRESSURE: 80 MMHG | BODY MASS INDEX: 21.79 KG/M2 | HEIGHT: 64 IN | DIASTOLIC BLOOD PRESSURE: 40 MMHG | WEIGHT: 127.63 LBS

## 2023-04-06 DIAGNOSIS — R55 SYNCOPE, UNSPECIFIED SYNCOPE TYPE: ICD-10-CM

## 2023-04-06 DIAGNOSIS — J01.00 ACUTE NON-RECURRENT MAXILLARY SINUSITIS: Primary | ICD-10-CM

## 2023-04-06 DIAGNOSIS — D64.9 ANEMIA, UNSPECIFIED TYPE: ICD-10-CM

## 2023-04-06 DIAGNOSIS — E03.9 HYPOTHYROIDISM, UNSPECIFIED TYPE: ICD-10-CM

## 2023-04-06 PROCEDURE — 1159F PR MEDICATION LIST DOCUMENTED IN MEDICAL RECORD: ICD-10-PCS | Mod: ,,, | Performed by: FAMILY MEDICINE

## 2023-04-06 PROCEDURE — 3078F DIAST BP <80 MM HG: CPT | Mod: ,,, | Performed by: FAMILY MEDICINE

## 2023-04-06 PROCEDURE — 3074F PR MOST RECENT SYSTOLIC BLOOD PRESSURE < 130 MM HG: ICD-10-PCS | Mod: ,,, | Performed by: FAMILY MEDICINE

## 2023-04-06 PROCEDURE — 96372 THER/PROPH/DIAG INJ SC/IM: CPT | Mod: ,,, | Performed by: FAMILY MEDICINE

## 2023-04-06 PROCEDURE — 1159F MED LIST DOCD IN RCRD: CPT | Mod: ,,, | Performed by: FAMILY MEDICINE

## 2023-04-06 PROCEDURE — 96372 PR INJECTION,THERAP/PROPH/DIAG2ST, IM OR SUBCUT: ICD-10-PCS | Mod: ,,, | Performed by: FAMILY MEDICINE

## 2023-04-06 PROCEDURE — 3074F SYST BP LT 130 MM HG: CPT | Mod: ,,, | Performed by: FAMILY MEDICINE

## 2023-04-06 PROCEDURE — 3008F PR BODY MASS INDEX (BMI) DOCUMENTED: ICD-10-PCS | Mod: ,,, | Performed by: FAMILY MEDICINE

## 2023-04-06 PROCEDURE — 4010F PR ACE/ARB THEARPY RXD/TAKEN: ICD-10-PCS | Mod: ,,, | Performed by: FAMILY MEDICINE

## 2023-04-06 PROCEDURE — 3008F BODY MASS INDEX DOCD: CPT | Mod: ,,, | Performed by: FAMILY MEDICINE

## 2023-04-06 PROCEDURE — 99214 OFFICE O/P EST MOD 30 MIN: CPT | Mod: 25,,, | Performed by: FAMILY MEDICINE

## 2023-04-06 PROCEDURE — 3078F PR MOST RECENT DIASTOLIC BLOOD PRESSURE < 80 MM HG: ICD-10-PCS | Mod: ,,, | Performed by: FAMILY MEDICINE

## 2023-04-06 PROCEDURE — 99214 PR OFFICE/OUTPT VISIT, EST, LEVL IV, 30-39 MIN: ICD-10-PCS | Mod: 25,,, | Performed by: FAMILY MEDICINE

## 2023-04-06 PROCEDURE — 4010F ACE/ARB THERAPY RXD/TAKEN: CPT | Mod: ,,, | Performed by: FAMILY MEDICINE

## 2023-04-06 RX ORDER — CYPROHEPTADINE HYDROCHLORIDE 4 MG/1
4 TABLET ORAL 3 TIMES DAILY PRN
Qty: 60 TABLET | Refills: 3 | Status: SHIPPED | OUTPATIENT
Start: 2023-04-06

## 2023-04-06 RX ORDER — CEFTRIAXONE 1 G/1
1 INJECTION, POWDER, FOR SOLUTION INTRAMUSCULAR; INTRAVENOUS
Status: COMPLETED | OUTPATIENT
Start: 2023-04-06 | End: 2023-04-06

## 2023-04-06 RX ORDER — METOPROLOL SUCCINATE 50 MG/1
TABLET, EXTENDED RELEASE ORAL
COMMUNITY
Start: 2023-04-03 | End: 2023-04-06

## 2023-04-06 RX ORDER — SACUBITRIL AND VALSARTAN 24; 26 MG/1; MG/1
1 TABLET, FILM COATED ORAL 2 TIMES DAILY
COMMUNITY
Start: 2023-01-05

## 2023-04-06 RX ORDER — EVOLOCUMAB 140 MG/ML
INJECTION, SOLUTION SUBCUTANEOUS
COMMUNITY
Start: 2023-03-14

## 2023-04-06 RX ORDER — CLOPIDOGREL BISULFATE 75 MG/1
TABLET ORAL
COMMUNITY
Start: 2023-03-07

## 2023-04-06 RX ORDER — DEXAMETHASONE SODIUM PHOSPHATE 4 MG/ML
2 INJECTION, SOLUTION INTRA-ARTICULAR; INTRALESIONAL; INTRAMUSCULAR; INTRAVENOUS; SOFT TISSUE
Status: COMPLETED | OUTPATIENT
Start: 2023-04-06 | End: 2023-04-06

## 2023-04-06 RX ORDER — METHYLPREDNISOLONE ACETATE 80 MG/ML
20 INJECTION, SUSPENSION INTRA-ARTICULAR; INTRALESIONAL; INTRAMUSCULAR; SOFT TISSUE
Status: COMPLETED | OUTPATIENT
Start: 2023-04-06 | End: 2023-04-06

## 2023-04-06 RX ORDER — AZITHROMYCIN 250 MG/1
TABLET, FILM COATED ORAL
Qty: 6 TABLET | Refills: 0 | Status: SHIPPED | OUTPATIENT
Start: 2023-04-06 | End: 2023-04-11

## 2023-04-06 RX ADMIN — DEXAMETHASONE SODIUM PHOSPHATE 2 MG: 4 INJECTION, SOLUTION INTRA-ARTICULAR; INTRALESIONAL; INTRAMUSCULAR; INTRAVENOUS; SOFT TISSUE at 03:04

## 2023-04-06 RX ADMIN — CEFTRIAXONE 1 G: 1 INJECTION, POWDER, FOR SOLUTION INTRAMUSCULAR; INTRAVENOUS at 03:04

## 2023-04-06 RX ADMIN — METHYLPREDNISOLONE ACETATE 20 MG: 80 INJECTION, SUSPENSION INTRA-ARTICULAR; INTRALESIONAL; INTRAMUSCULAR; SOFT TISSUE at 03:04

## 2023-04-06 NOTE — PROGRESS NOTES
Vicente Guzmán MD   [facility]  [address] [place] [address]     PATIENT NAME: Anabelle Gaspar  : 1955  DATE: 23  MRN: [id number]      Billing Provider: Vicente Guzmán MD  Level of Service: DC OFFICE/OUTPT VISIT, EST, LEVL III, 20-29 MIN  Patient PCP Information       Provider PCP Type    Vicente Guzmán MD General            Reason for Visit / Chief Complaint: Cough (Cough, congestion, weak, not eating x2 days), Dizziness (Dizzy spells that come and go mostly with quick movements), Weight Gain (Patient reports weight normally 123 and up to 126.4 this am on home scale. States she is taking metolazone this afternoon.), and Diarrhea (Patient reports diarrhea x2 weeks )         History of Present Illness / Problem Focused Workflow     Anabelle Gaspar presents to the clinic with Cough (Cough, congestion, weak, not eating x2 days), Dizziness (Dizzy spells that come and go mostly with quick movements), Weight Gain (Patient reports weight normally 123 and up to 126.4 this am on home scale. States she is taking metolazone this afternoon.), and Diarrhea (Patient reports diarrhea x2 weeks )     HPI    Review of Systems     Review of Systems   Constitutional:  Negative for activity change, appetite change, fatigue and fever.   HENT:  Positive for nasal congestion, sinus pressure/congestion and sore throat. Negative for ear pain and hearing loss.    Respiratory:  Positive for cough. Negative for chest tightness and shortness of breath.    Cardiovascular:  Negative for chest pain and palpitations.   Gastrointestinal:  Negative for abdominal pain and fecal incontinence.   Genitourinary:  Negative for bladder incontinence and difficulty urinating.   Musculoskeletal:  Negative for arthralgias.   Integumentary:  Negative for rash.   Neurological:  Negative for dizziness and headaches.      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     HYSTERECTOMY      NECK SURGERY      SINUS SURGERY      STENTS      X 4       Social History  Anabelle Gaspar  reports that she has been smoking cigarettes. She started smoking about 51 years ago. She has been smoking an average of 2 packs per day. She has never used smokeless tobacco. She reports that she does not currently use alcohol. She reports that she does not use drugs.    Family History  Anabelle Gaspar  family history includes Cancer in her sister; Diabetes in her mother and sister; Heart disease in her mother; Hyperlipidemia in her mother and sister; Hypertension in her mother and sister.    Medications and Allergies     Medications  Outpatient Medications Marked as Taking for the 4/6/23 encounter (Office Visit) with Vicente Guzmán MD   Medication Sig Dispense Refill    aspirin (ECOTRIN) 81 MG EC tablet Take 81 mg by mouth once daily.      butalbitaL-acetaminophen  mg Tab TAKE 1 OR 2 TABLETS BY MOUTH EVERY 4 HOURS AS NEEDED FOR PAIN      CALCIUM CITRATE ORAL Take 1 tablet by mouth once daily.      clopidogreL (PLAVIX) 75 mg tablet Take by mouth.      ENTRESTO 24-26 mg per tablet       ergocalciferol, vitamin D2, (VITAMIN D ORAL) Take 1 tablet by mouth once daily.      ferrous sulfate 325 (65 FE) MG EC tablet Take 1 tablet (325 mg total) by mouth 2 (two) times daily. 60 tablet 11    furosemide (LASIX) 80 MG tablet Take 80 mg by mouth 2 (two) times a day.      gabapentin (NEURONTIN) 300 MG capsule Take 300 mg by mouth 3 (three) times daily.      HYDROcodone-acetaminophen (NORCO)  mg per tablet TAKE 1 TABLET BY MOUTH 4 TIMES A DAY IF NEEDED FOR PAIN.       isosorbide mononitrate (IMDUR) 60 MG 24 hr tablet Take 60 mg by mouth 2 (two) times daily.      levothyroxine (SYNTHROID) 50 MCG tablet TAKE 1 TABLET BEFORE BREAKFAST 90 tablet 1    methocarbamoL (ROBAXIN) 750 MG Tab Take 750 mg by mouth 3 (three) times daily as needed.      metOLazone (ZAROXOLYN) 2.5 MG tablet Take 2.5 mg by mouth daily as needed. Take as needed for a weight gain of 3+      multivitamin (THERAGRAN) per tablet Take 1 tablet by mouth once daily.      nitroGLYCERIN (NITROSTAT) 0.4 MG SL tablet Place 0.4 mg under the tongue every 5 (five) minutes as needed for Chest pain.      paroxetine (PAXIL) 40 MG tablet TAKE 1 TABLET EVERY DAY 90 tablet 1    potassium chloride SA (K-DUR,KLOR-CON) 20 MEQ tablet Take 1 tablet by mouth 2 (two) times a day.      promethazine (PHENERGAN) 25 MG tablet TAKE 1 TABLET BY MOUTH EVERYDAY IF NEEDED      REPATHA SURECLICK 140 mg/mL PnIj Inject into the skin.      topiramate (TOPAMAX) 200 MG Tab Take 200 mg by mouth nightly.      zolpidem (AMBIEN) 10 mg Tab TAKE 1 TABLET (10 MG TOTAL) BY MOUTH NIGHTLY AS NEEDED (INSOMNIA). 30 tablet 2    [DISCONTINUED] amitriptyline (ELAVIL) 100 MG tablet Take 100 mg by mouth nightly.      [DISCONTINUED] cyproheptadine (PERIACTIN) 4 mg tablet Take 1 tablet (4 mg total) by mouth 3 (three) times daily as needed. 60 tablet 0    [DISCONTINUED] DULoxetine (CYMBALTA) 60 MG capsule Take 60 mg by mouth 2 (two) times daily.      [DISCONTINUED] hydrALAZINE (APRESOLINE) 10 MG tablet Take 10 mg by mouth 3 (three) times daily.      [DISCONTINUED] metoprolol succinate (TOPROL-XL) 50 MG 24 hr tablet Take by mouth.       Current Facility-Administered Medications for the 4/6/23 encounter (Office Visit) with Vicente Guzmán MD   Medication Dose Route Frequency Provider Last Rate Last Admin    [COMPLETED] cefTRIAXone injection 1 g  1 g Intramuscular 1 time in Clinic/HOD Vicente Guzmán MD   1 g at 04/06/23 1552    [COMPLETED] dexAMETHasone injection 2  mg  2 mg Intramuscular 1 time in Clinic/HOD Vicente Guzmán MD   2 mg at 04/06/23 1552    [COMPLETED] methylPREDNISolone acetate injection 20 mg  20 mg Intramuscular 1 time in Clinic/HOD Vicente Guzmán MD   20 mg at 04/06/23 1552       Allergies  Review of patient's allergies indicates:   Allergen Reactions    Levaquin [levofloxacin] Swelling    Dexamethasone Edema and Other (See Comments)    Prednisone Other (See Comments)       Physical Examination     Vitals:    04/06/23 1520   BP: (!) 80/40   Pulse: 82   Temp: 98.8 °F (37.1 °C)     Physical Exam  Constitutional:       General: She is not in acute distress.     Appearance: She is not ill-appearing.   HENT:      Head: Normocephalic and atraumatic.      Right Ear: Tympanic membrane and ear canal normal.      Left Ear: Tympanic membrane and ear canal normal.      Nose: Nose normal. No congestion or rhinorrhea.   Eyes:      Pupils: Pupils are equal, round, and reactive to light.   Cardiovascular:      Rate and Rhythm: Normal rate and regular rhythm.      Pulses: Normal pulses.      Heart sounds: No murmur heard.  Pulmonary:      Effort: No respiratory distress.      Breath sounds: No wheezing, rhonchi or rales.   Abdominal:      General: Bowel sounds are normal.      Palpations: Abdomen is soft.      Tenderness: There is no abdominal tenderness.      Hernia: No hernia is present.   Musculoskeletal:      Cervical back: Normal range of motion and neck supple.   Lymphadenopathy:      Cervical: No cervical adenopathy.   Skin:     General: Skin is warm and dry.   Neurological:      Mental Status: She is alert.   Psychiatric:         Behavior: Behavior normal.         Thought Content: Thought content normal.        Assessment and Plan (including Health Maintenance)   :    Plan:         Health Maintenance Due   Topic Date Due    Hepatitis C Screening  Never done    Pneumococcal Vaccines (Age 65+) (1 - PCV) Never done    TETANUS VACCINE  Never done    DEXA Scan   Never done    Shingles Vaccine (1 of 2) Never done    Pap Smear  01/29/2017    COVID-19 Vaccine (2 - Moderna series) 09/14/2021    Influenza Vaccine (1) 09/01/2022       Problem List Items Addressed This Visit          Oncology    Anemia       Endocrine    Hypothyroidism     Other Visit Diagnoses       Acute non-recurrent maxillary sinusitis    -  Primary    Syncope, unspecified syncope type              Acute non-recurrent maxillary sinusitis    Anemia, unspecified type    Syncope, unspecified syncope type    Hypothyroidism, unspecified type    Other orders  -     cyproheptadine (PERIACTIN) 4 mg tablet; Take 1 tablet (4 mg total) by mouth 3 (three) times daily as needed.  Dispense: 60 tablet; Refill: 3  -     dexAMETHasone injection 2 mg  -     methylPREDNISolone acetate injection 20 mg  -     cefTRIAXone injection 1 g  -     azithromycin (Z-ELINOR) 250 MG tablet; Take 2 tablets by mouth on day 1; Take 1 tablet by mouth on days 2-5  Dispense: 6 tablet; Refill: 0       Health Maintenance Topics with due status: Not Due       Topic Last Completion Date    Colorectal Cancer Screening 07/03/2019    Mammogram 07/28/2022    Lipid Panel 12/12/2022    Aspirin/Antiplatelet Therapy 04/06/2023       Procedures     Future Appointments   Date Time Provider Department Center   9/28/2023  2:00 PM [facility]1 [facility]        No follow-ups on file.       Signature:  Vicente Guzmán MD  [facility]  [address] [place] [address]  [phone] Phone  [phone] Fax    Date of encounter: 4/6/23

## 2023-05-23 ENCOUNTER — OFFICE VISIT (OUTPATIENT)
Dept: FAMILY MEDICINE | Facility: CLINIC | Age: 68
End: 2023-05-23
Payer: COMMERCIAL

## 2023-05-23 VITALS
HEIGHT: 64 IN | SYSTOLIC BLOOD PRESSURE: 90 MMHG | HEART RATE: 66 BPM | DIASTOLIC BLOOD PRESSURE: 42 MMHG | WEIGHT: 126.19 LBS | BODY MASS INDEX: 21.54 KG/M2 | TEMPERATURE: 99 F | OXYGEN SATURATION: 96 %

## 2023-05-23 DIAGNOSIS — R53.1 WEAKNESS: Primary | ICD-10-CM

## 2023-05-23 DIAGNOSIS — E53.8 B12 DEFICIENCY: ICD-10-CM

## 2023-05-23 DIAGNOSIS — D64.9 ANEMIA, UNSPECIFIED TYPE: ICD-10-CM

## 2023-05-23 DIAGNOSIS — R55 SYNCOPE, UNSPECIFIED SYNCOPE TYPE: ICD-10-CM

## 2023-05-23 LAB
ALBUMIN SERPL BCP-MCNC: 3.2 G/DL (ref 3.5–5)
ALBUMIN/GLOB SERPL: 0.7 {RATIO}
ALP SERPL-CCNC: 127 U/L (ref 55–142)
ALT SERPL W P-5'-P-CCNC: 14 U/L (ref 13–56)
ANION GAP SERPL CALCULATED.3IONS-SCNC: 7 MMOL/L (ref 7–16)
AST SERPL W P-5'-P-CCNC: 18 U/L (ref 15–37)
BASOPHILS # BLD AUTO: 0.06 K/UL (ref 0–0.2)
BASOPHILS NFR BLD AUTO: 0.9 % (ref 0–1)
BILIRUB SERPL-MCNC: 0.3 MG/DL (ref ?–1.2)
BUN SERPL-MCNC: 35 MG/DL (ref 7–18)
BUN/CREAT SERPL: 24 (ref 6–20)
CALCIUM SERPL-MCNC: 9.5 MG/DL (ref 8.5–10.1)
CHLORIDE SERPL-SCNC: 95 MMOL/L (ref 98–107)
CO2 SERPL-SCNC: 35 MMOL/L (ref 21–32)
CREAT SERPL-MCNC: 1.47 MG/DL (ref 0.55–1.02)
DIFFERENTIAL METHOD BLD: ABNORMAL
EGFR (NO RACE VARIABLE) (RUSH/TITUS): 39 ML/MIN/1.73M2
EOSINOPHIL # BLD AUTO: 0.2 K/UL (ref 0–0.5)
EOSINOPHIL NFR BLD AUTO: 2.9 % (ref 1–4)
ERYTHROCYTE [DISTWIDTH] IN BLOOD BY AUTOMATED COUNT: 16.2 % (ref 11.5–14.5)
GLOBULIN SER-MCNC: 4.4 G/DL (ref 2–4)
GLUCOSE SERPL-MCNC: 112 MG/DL (ref 74–106)
HCT VFR BLD AUTO: 39.2 % (ref 38–47)
HGB BLD-MCNC: 12 G/DL (ref 12–16)
IMM GRANULOCYTES # BLD AUTO: 0.02 K/UL (ref 0–0.04)
IMM GRANULOCYTES NFR BLD: 0.3 % (ref 0–0.4)
LYMPHOCYTES # BLD AUTO: 1.45 K/UL (ref 1–4.8)
LYMPHOCYTES NFR BLD AUTO: 20.8 % (ref 27–41)
MCH RBC QN AUTO: 29.4 PG (ref 27–31)
MCHC RBC AUTO-ENTMCNC: 30.6 G/DL (ref 32–36)
MCV RBC AUTO: 96.1 FL (ref 80–96)
MONOCYTES # BLD AUTO: 0.99 K/UL (ref 0–0.8)
MONOCYTES NFR BLD AUTO: 14.2 % (ref 2–6)
MPC BLD CALC-MCNC: 9.4 FL (ref 9.4–12.4)
NEUTROPHILS # BLD AUTO: 4.26 K/UL (ref 1.8–7.7)
NEUTROPHILS NFR BLD AUTO: 60.9 % (ref 53–65)
NRBC # BLD AUTO: 0 X10E3/UL
NRBC, AUTO (.00): 0 %
PLATELET # BLD AUTO: 472 K/UL (ref 150–400)
POTASSIUM SERPL-SCNC: 3.5 MMOL/L (ref 3.5–5.1)
PROT SERPL-MCNC: 7.6 G/DL (ref 6.4–8.2)
RBC # BLD AUTO: 4.08 M/UL (ref 4.2–5.4)
SODIUM SERPL-SCNC: 133 MMOL/L (ref 136–145)
WBC # BLD AUTO: 6.98 K/UL (ref 4.5–11)

## 2023-05-23 PROCEDURE — 85025 COMPLETE CBC W/AUTO DIFF WBC: CPT | Mod: ,,, | Performed by: CLINICAL MEDICAL LABORATORY

## 2023-05-23 PROCEDURE — 99213 PR OFFICE/OUTPT VISIT, EST, LEVL III, 20-29 MIN: ICD-10-PCS | Mod: ,,, | Performed by: FAMILY MEDICINE

## 2023-05-23 PROCEDURE — 1159F MED LIST DOCD IN RCRD: CPT | Mod: ,,, | Performed by: FAMILY MEDICINE

## 2023-05-23 PROCEDURE — 3078F PR MOST RECENT DIASTOLIC BLOOD PRESSURE < 80 MM HG: ICD-10-PCS | Mod: ,,, | Performed by: FAMILY MEDICINE

## 2023-05-23 PROCEDURE — 4010F PR ACE/ARB THEARPY RXD/TAKEN: ICD-10-PCS | Mod: ,,, | Performed by: FAMILY MEDICINE

## 2023-05-23 PROCEDURE — 1100F PTFALLS ASSESS-DOCD GE2>/YR: CPT | Mod: ,,, | Performed by: FAMILY MEDICINE

## 2023-05-23 PROCEDURE — 3288F FALL RISK ASSESSMENT DOCD: CPT | Mod: ,,, | Performed by: FAMILY MEDICINE

## 2023-05-23 PROCEDURE — 3288F PR FALLS RISK ASSESSMENT DOCUMENTED: ICD-10-PCS | Mod: ,,, | Performed by: FAMILY MEDICINE

## 2023-05-23 PROCEDURE — 1100F PR PT FALLS ASSESS DOC 2+ FALLS/FALL W/INJURY/YR: ICD-10-PCS | Mod: ,,, | Performed by: FAMILY MEDICINE

## 2023-05-23 PROCEDURE — 3008F PR BODY MASS INDEX (BMI) DOCUMENTED: ICD-10-PCS | Mod: ,,, | Performed by: FAMILY MEDICINE

## 2023-05-23 PROCEDURE — 4010F ACE/ARB THERAPY RXD/TAKEN: CPT | Mod: ,,, | Performed by: FAMILY MEDICINE

## 2023-05-23 PROCEDURE — 80053 COMPREHEN METABOLIC PANEL: CPT | Mod: ,,, | Performed by: CLINICAL MEDICAL LABORATORY

## 2023-05-23 PROCEDURE — 3078F DIAST BP <80 MM HG: CPT | Mod: ,,, | Performed by: FAMILY MEDICINE

## 2023-05-23 PROCEDURE — 3074F SYST BP LT 130 MM HG: CPT | Mod: ,,, | Performed by: FAMILY MEDICINE

## 2023-05-23 PROCEDURE — 80053 COMPREHENSIVE METABOLIC PANEL: ICD-10-PCS | Mod: ,,, | Performed by: CLINICAL MEDICAL LABORATORY

## 2023-05-23 PROCEDURE — 1159F PR MEDICATION LIST DOCUMENTED IN MEDICAL RECORD: ICD-10-PCS | Mod: ,,, | Performed by: FAMILY MEDICINE

## 2023-05-23 PROCEDURE — 3074F PR MOST RECENT SYSTOLIC BLOOD PRESSURE < 130 MM HG: ICD-10-PCS | Mod: ,,, | Performed by: FAMILY MEDICINE

## 2023-05-23 PROCEDURE — 99213 OFFICE O/P EST LOW 20 MIN: CPT | Mod: ,,, | Performed by: FAMILY MEDICINE

## 2023-05-23 PROCEDURE — 85025 CBC WITH DIFFERENTIAL: ICD-10-PCS | Mod: ,,, | Performed by: CLINICAL MEDICAL LABORATORY

## 2023-05-23 PROCEDURE — 3008F BODY MASS INDEX DOCD: CPT | Mod: ,,, | Performed by: FAMILY MEDICINE

## 2023-05-23 RX ORDER — AMOXICILLIN 500 MG/1
500 TABLET, FILM COATED ORAL EVERY 8 HOURS
Qty: 30 TABLET | Refills: 0 | Status: SHIPPED | OUTPATIENT
Start: 2023-05-23 | End: 2023-06-02

## 2023-05-23 RX ORDER — DULOXETIN HYDROCHLORIDE 60 MG/1
1 CAPSULE, DELAYED RELEASE ORAL 2 TIMES DAILY
COMMUNITY
Start: 2023-05-11

## 2023-05-23 RX ORDER — METOPROLOL SUCCINATE 50 MG/1
1 TABLET, EXTENDED RELEASE ORAL DAILY
COMMUNITY
Start: 2023-05-05

## 2023-05-23 RX ORDER — AMITRIPTYLINE HYDROCHLORIDE 100 MG/1
1 TABLET ORAL DAILY
COMMUNITY
Start: 2023-05-11

## 2023-05-23 NOTE — PROGRESS NOTES
Vicente Guzmán MD   [facility]  [address] [place] [address]     PATIENT NAME: Anabelle Gaspar  : 1955  DATE: 23  MRN: [id number]      Billing Provider: Vicente Guzmán MD  Level of Service: LA OFFICE/OUTPT VISIT, EST, LEVL III, 20-29 MIN  Patient PCP Information       Provider PCP Type    Vicente Guzmán MD General            Reason for Visit / Chief Complaint: Extremity Weakness (Patient reports episodes are getting more frequently from every so often to multiple times daily. Patient describes as legs give out while standing. Patient denies any chest pain or blacking out with episodes. Patient reports falling Saturday after getting out of truck. Legs feel as if a noodle. ), Anxiety (Discuss if patient can take an additional paxil as needed when feeling on edge. ), and Hypotension (Patient reports with home blood pressure cuff, BP 90s-40s. )         History of Present Illness / Problem Focused Workflow     Anabelle Gaspar presents to the clinic with Extremity Weakness (Patient reports episodes are getting more frequently from every so often to multiple times daily. Patient describes as legs give out while standing. Patient denies any chest pain or blacking out with episodes. Patient reports falling Saturday after getting out of truck. Legs feel as if a noodle. ), Anxiety (Discuss if patient can take an additional paxil as needed when feeling on edge. ), and Hypotension (Patient reports with home blood pressure cuff, BP 90s-40s. )     HPI    Review of Systems     Review of Systems   Constitutional:  Positive for fatigue. Negative for activity change, appetite change and fever.   HENT:  Negative for nasal congestion, ear pain, hearing loss, sinus pressure/congestion and sore throat.    Respiratory:  Negative for cough, chest tightness and shortness of breath.    Cardiovascular:  Negative for chest pain and palpitations.   Gastrointestinal:  Negative for  abdominal pain and fecal incontinence.   Genitourinary:  Negative for bladder incontinence and difficulty urinating.   Musculoskeletal:  Negative for arthralgias.   Integumentary:  Negative for rash.   Neurological:  Positive for weakness and headaches. Negative for dizziness.      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     HYSTERECTOMY      NECK SURGERY      SINUS SURGERY      STENTS      X 4       Social History  Anabelle Gaspar  reports that she has been smoking cigarettes. She started smoking about 51 years ago. She has been smoking an average of 2 packs per day. She has never used smokeless tobacco. She reports that she does not currently use alcohol. She reports that she does not use drugs.    Family History  Anabelle Gaspar  family history includes Cancer in her sister; Diabetes in her mother and sister; Heart disease in her mother; Hyperlipidemia in her mother and sister; Hypertension in her mother and sister.    Medications and Allergies     Medications  Outpatient Medications Marked as Taking for the 5/23/23 encounter (Office Visit) with Vicente Guzmán MD   Medication Sig Dispense Refill    amitriptyline (ELAVIL) 100 MG tablet Take 1 tablet by mouth once daily.      aspirin (ECOTRIN) 81 MG EC tablet Take 81 mg by mouth once daily.      butalbitaL-acetaminophen  mg Tab TAKE 1 OR 2 TABLETS BY MOUTH EVERY 4 HOURS AS NEEDED FOR PAIN      CALCIUM CITRATE ORAL Take 1 tablet by mouth once daily.      clopidogreL (PLAVIX) 75 mg tablet Take by mouth.      cyproheptadine (PERIACTIN) 4 mg tablet Take 1 tablet (4 mg total) by mouth  3 (three) times daily as needed. 60 tablet 3    DULoxetine (CYMBALTA) 60 MG capsule Take 1 capsule by mouth 2 (two) times a day.      ENTRESTO 24-26 mg per tablet 1 tablet 2 (two) times daily.      ergocalciferol, vitamin D2, (VITAMIN D ORAL) Take 1 tablet by mouth once daily.      ferrous sulfate 325 (65 FE) MG EC tablet Take 1 tablet (325 mg total) by mouth 2 (two) times daily. 60 tablet 11    furosemide (LASIX) 80 MG tablet Take 80 mg by mouth 2 (two) times a day.      gabapentin (NEURONTIN) 300 MG capsule Take 300 mg by mouth 3 (three) times daily.      HYDROcodone-acetaminophen (NORCO)  mg per tablet TAKE 1 TABLET BY MOUTH 4 TIMES A DAY IF NEEDED FOR PAIN.      levothyroxine (SYNTHROID) 50 MCG tablet TAKE 1 TABLET BEFORE BREAKFAST 90 tablet 1    methocarbamoL (ROBAXIN) 750 MG Tab Take 750 mg by mouth 3 (three) times daily as needed.      metOLazone (ZAROXOLYN) 2.5 MG tablet Take 2.5 mg by mouth daily as needed. Take as needed for a weight gain of 3+      metoprolol succinate (TOPROL-XL) 50 MG 24 hr tablet Take 1 tablet by mouth once daily.      multivitamin (THERAGRAN) per tablet Take 1 tablet by mouth once daily.      nitroGLYCERIN (NITROSTAT) 0.4 MG SL tablet Place 0.4 mg under the tongue every 5 (five) minutes as needed for Chest pain.      paroxetine (PAXIL) 40 MG tablet TAKE 1 TABLET EVERY DAY 90 tablet 1    promethazine (PHENERGAN) 25 MG tablet TAKE 1 TABLET BY MOUTH EVERYDAY IF NEEDED      REPATHA SURECLICK 140 mg/mL PnIj Inject into the skin every 14 (fourteen) days.      topiramate (TOPAMAX) 100 MG tablet Take 1,000 mg by mouth nightly.      zolpidem (AMBIEN) 10 mg Tab TAKE 1 TABLET (10 MG TOTAL) BY MOUTH NIGHTLY AS NEEDED (INSOMNIA). 30 tablet 2       Allergies  Review of patient's allergies indicates:   Allergen Reactions    Levaquin [levofloxacin] Swelling    Dexamethasone Edema and Other (See Comments)    Prednisone Other (See Comments)       Physical Examination     Vitals:    05/23/23 1041    BP: (!) 90/42   Pulse: 66   Temp: 98.8 °F (37.1 °C)     Physical Exam  Constitutional:       General: She is not in acute distress.     Appearance: She is not ill-appearing.   HENT:      Head: Normocephalic and atraumatic.      Right Ear: Tympanic membrane and ear canal normal.      Left Ear: Tympanic membrane and ear canal normal.      Nose: Nose normal. No congestion or rhinorrhea.   Eyes:      Pupils: Pupils are equal, round, and reactive to light.   Cardiovascular:      Rate and Rhythm: Normal rate and regular rhythm.      Pulses: Normal pulses.      Heart sounds: No murmur heard.  Pulmonary:      Effort: No respiratory distress.      Breath sounds: No wheezing, rhonchi or rales.   Abdominal:      General: Bowel sounds are normal.      Palpations: Abdomen is soft.      Tenderness: There is no abdominal tenderness.      Hernia: No hernia is present.   Musculoskeletal:      Cervical back: Normal range of motion and neck supple.   Lymphadenopathy:      Cervical: No cervical adenopathy.   Skin:     General: Skin is warm and dry.   Neurological:      Mental Status: She is alert.   Psychiatric:         Behavior: Behavior normal.         Thought Content: Thought content normal.        Assessment and Plan (including Health Maintenance)   :    Plan:         Health Maintenance Due   Topic Date Due    Hepatitis C Screening  Never done    Pneumococcal Vaccines (Age 65+) (1 - PCV) Never done    TETANUS VACCINE  Never done    DEXA Scan  Never done    Shingles Vaccine (1 of 2) Never done    Pap Smear  01/29/2017    COVID-19 Vaccine (2 - Moderna series) 09/14/2021       Problem List Items Addressed This Visit          Oncology    Anemia     Other Visit Diagnoses       Weakness    -  Primary    Relevant Orders    CBC Auto Differential (Completed)    Comprehensive Metabolic Panel (Completed)    B12 deficiency        Syncope, unspecified syncope type              Weakness  -     CBC Auto Differential; Future; Expected date:  05/23/2023  -     Comprehensive Metabolic Panel; Future; Expected date: 05/23/2023    B12 deficiency    Anemia, unspecified type    Syncope, unspecified syncope type    Other orders  -     amoxicillin (AMOXIL) 500 MG Tab; Take 1 tablet (500 mg total) by mouth every 8 (eight) hours. for 10 days  Dispense: 30 tablet; Refill: 0       Health Maintenance Topics with due status: Not Due       Topic Last Completion Date    Colorectal Cancer Screening 07/03/2019    Influenza Vaccine 10/29/2019    Mammogram 07/28/2022    Lipid Panel 12/12/2022    Aspirin/Antiplatelet Therapy 05/23/2023       Procedures     Future Appointments   Date Time Provider Department Center   9/28/2023  2:00 PM [facility] MAMMO1 [facility]        No follow-ups on file.       Signature:  Vicente Guzmán MD  [facility]  [address] [place]   [place] [address]  [phone] Phone  [phone] Fax    Date of encounter: 5/23/23

## 2023-05-25 DIAGNOSIS — R29.898 WEAKNESS OF BOTH LOWER EXTREMITIES: ICD-10-CM

## 2023-05-25 DIAGNOSIS — R55 SYNCOPE, UNSPECIFIED SYNCOPE TYPE: Primary | ICD-10-CM

## 2023-05-30 RX ORDER — ZOLPIDEM TARTRATE 10 MG/1
10 TABLET ORAL NIGHTLY PRN
Qty: 30 TABLET | Refills: 2 | Status: SHIPPED | OUTPATIENT
Start: 2023-05-30 | End: 2023-06-06 | Stop reason: SDUPTHER

## 2023-06-06 RX ORDER — ZOLPIDEM TARTRATE 10 MG/1
10 TABLET ORAL NIGHTLY PRN
Qty: 30 TABLET | Refills: 0 | Status: SHIPPED | OUTPATIENT
Start: 2023-06-06 | End: 2023-07-05 | Stop reason: SDUPTHER

## 2023-06-06 NOTE — TELEPHONE ENCOUNTER
[facility] no longer paying for medication. Request refill to be sent to [facility].

## 2023-06-09 ENCOUNTER — OFFICE VISIT (OUTPATIENT)
Dept: NEUROLOGY | Facility: CLINIC | Age: 68
End: 2023-06-09
Payer: COMMERCIAL

## 2023-06-09 VITALS
HEIGHT: 64 IN | WEIGHT: 127.5 LBS | BODY MASS INDEX: 21.77 KG/M2 | SYSTOLIC BLOOD PRESSURE: 92 MMHG | HEART RATE: 75 BPM | DIASTOLIC BLOOD PRESSURE: 50 MMHG | OXYGEN SATURATION: 98 %

## 2023-06-09 DIAGNOSIS — R25.1 SPELLS OF TREMBLING: Primary | ICD-10-CM

## 2023-06-09 DIAGNOSIS — R29.898 WEAKNESS OF BOTH LOWER EXTREMITIES: ICD-10-CM

## 2023-06-09 PROCEDURE — 3078F PR MOST RECENT DIASTOLIC BLOOD PRESSURE < 80 MM HG: ICD-10-PCS | Mod: CPTII,,, | Performed by: PSYCHIATRY & NEUROLOGY

## 2023-06-09 PROCEDURE — 1160F PR REVIEW ALL MEDS BY PRESCRIBER/CLIN PHARMACIST DOCUMENTED: ICD-10-PCS | Mod: CPTII,,, | Performed by: PSYCHIATRY & NEUROLOGY

## 2023-06-09 PROCEDURE — 4010F PR ACE/ARB THEARPY RXD/TAKEN: ICD-10-PCS | Mod: CPTII,,, | Performed by: PSYCHIATRY & NEUROLOGY

## 2023-06-09 PROCEDURE — 1100F PTFALLS ASSESS-DOCD GE2>/YR: CPT | Mod: CPTII,,, | Performed by: PSYCHIATRY & NEUROLOGY

## 2023-06-09 PROCEDURE — 3078F DIAST BP <80 MM HG: CPT | Mod: CPTII,,, | Performed by: PSYCHIATRY & NEUROLOGY

## 2023-06-09 PROCEDURE — 1160F RVW MEDS BY RX/DR IN RCRD: CPT | Mod: CPTII,,, | Performed by: PSYCHIATRY & NEUROLOGY

## 2023-06-09 PROCEDURE — 1159F MED LIST DOCD IN RCRD: CPT | Mod: CPTII,,, | Performed by: PSYCHIATRY & NEUROLOGY

## 2023-06-09 PROCEDURE — 99204 OFFICE O/P NEW MOD 45 MIN: CPT | Mod: S$PBB,,, | Performed by: PSYCHIATRY & NEUROLOGY

## 2023-06-09 PROCEDURE — 3074F PR MOST RECENT SYSTOLIC BLOOD PRESSURE < 130 MM HG: ICD-10-PCS | Mod: CPTII,,, | Performed by: PSYCHIATRY & NEUROLOGY

## 2023-06-09 PROCEDURE — 1100F PR PT FALLS ASSESS DOC 2+ FALLS/FALL W/INJURY/YR: ICD-10-PCS | Mod: CPTII,,, | Performed by: PSYCHIATRY & NEUROLOGY

## 2023-06-09 PROCEDURE — 99204 PR OFFICE/OUTPT VISIT, NEW, LEVL IV, 45-59 MIN: ICD-10-PCS | Mod: S$PBB,,, | Performed by: PSYCHIATRY & NEUROLOGY

## 2023-06-09 PROCEDURE — 3074F SYST BP LT 130 MM HG: CPT | Mod: CPTII,,, | Performed by: PSYCHIATRY & NEUROLOGY

## 2023-06-09 PROCEDURE — 99215 OFFICE O/P EST HI 40 MIN: CPT | Mod: PBBFAC | Performed by: PSYCHIATRY & NEUROLOGY

## 2023-06-09 PROCEDURE — 4010F ACE/ARB THERAPY RXD/TAKEN: CPT | Mod: CPTII,,, | Performed by: PSYCHIATRY & NEUROLOGY

## 2023-06-09 PROCEDURE — 3288F PR FALLS RISK ASSESSMENT DOCUMENTED: ICD-10-PCS | Mod: CPTII,,, | Performed by: PSYCHIATRY & NEUROLOGY

## 2023-06-09 PROCEDURE — 3008F PR BODY MASS INDEX (BMI) DOCUMENTED: ICD-10-PCS | Mod: CPTII,,, | Performed by: PSYCHIATRY & NEUROLOGY

## 2023-06-09 PROCEDURE — 3288F FALL RISK ASSESSMENT DOCD: CPT | Mod: CPTII,,, | Performed by: PSYCHIATRY & NEUROLOGY

## 2023-06-09 PROCEDURE — 1159F PR MEDICATION LIST DOCUMENTED IN MEDICAL RECORD: ICD-10-PCS | Mod: CPTII,,, | Performed by: PSYCHIATRY & NEUROLOGY

## 2023-06-09 PROCEDURE — 3008F BODY MASS INDEX DOCD: CPT | Mod: CPTII,,, | Performed by: PSYCHIATRY & NEUROLOGY

## 2023-06-09 NOTE — PATIENT INSTRUCTIONS
Stop smoking tobacco   Control risk factors   F/u Cardiology regularly  Caution w/ opioid narcotics   F/u prn

## 2023-06-09 NOTE — PROGRESS NOTES
Subjective:       Patient ID: Anabelle Gaspar is a 67 y.o. female     Chief Complaint:    Chief Complaint   Patient presents with    Weakness     Near syncope        Allergies:  Levaquin [levofloxacin], Dexamethasone, and Prednisone    Current Medications:    Outpatient Encounter Medications as of 6/9/2023   Medication Sig Dispense Refill    amitriptyline (ELAVIL) 100 MG tablet Take 1 tablet by mouth once daily.      aspirin (ECOTRIN) 81 MG EC tablet Take 81 mg by mouth once daily.      butalbitaL-acetaminophen  mg Tab TAKE 1 OR 2 TABLETS BY MOUTH EVERY 4 HOURS AS NEEDED FOR PAIN      CALCIUM CITRATE ORAL Take 1 tablet by mouth once daily.      clopidogreL (PLAVIX) 75 mg tablet Take by mouth.      cyproheptadine (PERIACTIN) 4 mg tablet Take 1 tablet (4 mg total) by mouth 3 (three) times daily as needed. 60 tablet 3    DULoxetine (CYMBALTA) 60 MG capsule Take 1 capsule by mouth 2 (two) times a day.      ENTRESTO 24-26 mg per tablet 1 tablet 2 (two) times daily.      ergocalciferol, vitamin D2, (VITAMIN D ORAL) Take 1 tablet by mouth once daily.      ferrous sulfate 325 (65 FE) MG EC tablet Take 1 tablet (325 mg total) by mouth 2 (two) times daily. 60 tablet 11    furosemide (LASIX) 80 MG tablet Take 80 mg by mouth 2 (two) times a day.      gabapentin (NEURONTIN) 300 MG capsule Take 300 mg by mouth 3 (three) times daily.      HYDROcodone-acetaminophen (NORCO)  mg per tablet TAKE 1 TABLET BY MOUTH 4 TIMES A DAY IF NEEDED FOR PAIN.      isosorbide mononitrate (IMDUR) 60 MG 24 hr tablet Take 60 mg by mouth 2 (two) times daily.      levothyroxine (SYNTHROID) 50 MCG tablet TAKE 1 TABLET BEFORE BREAKFAST 90 tablet 1    methocarbamoL (ROBAXIN) 750 MG Tab Take 750 mg by mouth 3 (three) times daily as needed.      metOLazone (ZAROXOLYN) 2.5 MG tablet Take 2.5 mg by mouth daily as needed. Take as needed for a weight gain of 3+      metoprolol succinate (TOPROL-XL) 50 MG 24 hr tablet Take 1 tablet by mouth once  "daily.      multivitamin (THERAGRAN) per tablet Take 1 tablet by mouth once daily.      nitroGLYCERIN (NITROSTAT) 0.4 MG SL tablet Place 0.4 mg under the tongue every 5 (five) minutes as needed for Chest pain.      paroxetine (PAXIL) 40 MG tablet TAKE 1 TABLET EVERY DAY 90 tablet 1    potassium chloride SA (K-DUR,KLOR-CON) 20 MEQ tablet Take 1 tablet by mouth 2 (two) times a day.      promethazine (PHENERGAN) 25 MG tablet TAKE 1 TABLET BY MOUTH EVERYDAY IF NEEDED      REPATHA SURECLICK 140 mg/mL PnIj Inject into the skin every 14 (fourteen) days.      topiramate (TOPAMAX) 100 MG tablet Take 1,000 mg by mouth nightly.      zolpidem (AMBIEN) 10 mg Tab Take 1 tablet (10 mg total) by mouth nightly as needed (sleep). 30 tablet 0     No facility-administered encounter medications on file as of 6/9/2023.       History of Present Illness  68 yo WF referred for syncope however she denies any LOC or actual fainting spells ? 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     HYSTERECTOMY      NECK SURGERY      SINUS SURGERY      STENTS      X 4       Social History  Ms. Gaspar  reports that she has been smoking cigarettes. She started smoking about 51 years ago. She has been smoking an average of 2 packs per day. She has never used " "smokeless tobacco. She reports that she does not currently use alcohol. She reports that she does not use drugs.    Family History  Ms.'s Gaspar family history includes Cancer in her sister; Diabetes in her mother and sister; Heart disease in her mother; Hyperlipidemia in her mother and sister; Hypertension in her mother and sister.    Review of Systems  Review of Systems   Musculoskeletal:  Positive for back pain and neck pain.   Neurological:  Positive for dizziness.   Psychiatric/Behavioral:  Positive for depression. The patient is nervous/anxious.    All other systems reviewed and are negative.   Objective:   BP (!) 92/50 (BP Location: Left arm, Patient Position: Sitting, BP Method: Large (Manual))   Pulse 75   Ht 5' 4" (1.626 m)   Wt 57.8 kg (127 lb 8 oz)   SpO2 98%   BMI 21.89 kg/m²    NEUROLOGICAL EXAMINATION:     MENTAL STATUS   Oriented to person, place, and time.   Level of consciousness: alert  Knowledge: good.     CRANIAL NERVES   Cranial nerves II through XII intact.     MOTOR EXAM     Strength   Strength 5/5 throughout.     GAIT AND COORDINATION     Gait  Gait: normal     Physical Exam  Vitals reviewed.   Constitutional:       Appearance: She is normal weight.   Neurological:      General: No focal deficit present.      Mental Status: She is alert and oriented to person, place, and time. Mental status is at baseline.      Cranial Nerves: Cranial nerves 2-12 are intact.      Motor: Motor strength is normal.      Gait: Gait is intact.        Assessment:     Syncope, unspecified syncope type  -     Ambulatory referral/consult to Neurology    Weakness of both lower extremities  -     Ambulatory referral/consult to Neurology         Primary Diagnosis and ICD10  No primary diagnosis found.    Plan:     There are no Patient Instructions on file for this visit.    There are no discontinued medications.    Requested Prescriptions      No prescriptions requested or ordered in this encounter       "

## 2023-07-05 RX ORDER — ZOLPIDEM TARTRATE 10 MG/1
10 TABLET ORAL NIGHTLY PRN
Qty: 30 TABLET | Refills: 0 | Status: SHIPPED | OUTPATIENT
Start: 2023-07-05 | End: 2023-07-13 | Stop reason: SDUPTHER

## 2023-07-05 RX ORDER — LEVOTHYROXINE SODIUM 50 UG/1
50 TABLET ORAL
Qty: 90 TABLET | Refills: 0 | Status: SHIPPED | OUTPATIENT
Start: 2023-07-05 | End: 2023-07-13 | Stop reason: SDUPTHER

## 2023-07-05 NOTE — TELEPHONE ENCOUNTER
----- Message from Pat Blake sent at 7/5/2023 11:29 AM CDT -----  Pt is requesting refill on levothyroxine to be sent to [facility]    She is also requesting refill on Ambien to be sent to [facility]

## 2023-07-13 ENCOUNTER — OFFICE VISIT (OUTPATIENT)
Dept: FAMILY MEDICINE | Facility: CLINIC | Age: 68
End: 2023-07-13
Payer: COMMERCIAL

## 2023-07-13 DIAGNOSIS — R55 SYNCOPE, UNSPECIFIED SYNCOPE TYPE: ICD-10-CM

## 2023-07-13 DIAGNOSIS — E03.9 HYPOTHYROIDISM, UNSPECIFIED TYPE: Primary | ICD-10-CM

## 2023-07-13 DIAGNOSIS — R29.898 WEAKNESS OF BOTH LOWER EXTREMITIES: ICD-10-CM

## 2023-07-13 DIAGNOSIS — I10 HYPERTENSION, UNSPECIFIED TYPE: ICD-10-CM

## 2023-07-13 DIAGNOSIS — M54.50 LOW BACK PAIN, UNSPECIFIED BACK PAIN LATERALITY, UNSPECIFIED CHRONICITY, UNSPECIFIED WHETHER SCIATICA PRESENT: ICD-10-CM

## 2023-07-13 LAB
ALBUMIN SERPL BCP-MCNC: 3.2 G/DL (ref 3.5–5)
ALBUMIN/GLOB SERPL: 0.7 {RATIO}
ALP SERPL-CCNC: 140 U/L (ref 55–142)
ALT SERPL W P-5'-P-CCNC: 14 U/L (ref 13–56)
ANION GAP SERPL CALCULATED.3IONS-SCNC: 12 MMOL/L (ref 7–16)
AST SERPL W P-5'-P-CCNC: 18 U/L (ref 15–37)
BASOPHILS # BLD AUTO: 0.04 K/UL (ref 0–0.2)
BASOPHILS NFR BLD AUTO: 0.5 % (ref 0–1)
BILIRUB SERPL-MCNC: 0.3 MG/DL (ref ?–1.2)
BUN SERPL-MCNC: 20 MG/DL (ref 7–18)
BUN/CREAT SERPL: 18 (ref 6–20)
CALCIUM SERPL-MCNC: 9.1 MG/DL (ref 8.5–10.1)
CHLORIDE SERPL-SCNC: 101 MMOL/L (ref 98–107)
CHOLEST SERPL-MCNC: 102 MG/DL (ref 0–200)
CHOLEST/HDLC SERPL: 1.8 {RATIO}
CO2 SERPL-SCNC: 32 MMOL/L (ref 21–32)
CREAT SERPL-MCNC: 1.09 MG/DL (ref 0.55–1.02)
DIFFERENTIAL METHOD BLD: ABNORMAL
EGFR (NO RACE VARIABLE) (RUSH/TITUS): 56 ML/MIN/1.73M2
EOSINOPHIL # BLD AUTO: 0.11 K/UL (ref 0–0.5)
EOSINOPHIL NFR BLD AUTO: 1.5 % (ref 1–4)
ERYTHROCYTE [DISTWIDTH] IN BLOOD BY AUTOMATED COUNT: 14.6 % (ref 11.5–14.5)
GLOBULIN SER-MCNC: 4.4 G/DL (ref 2–4)
GLUCOSE SERPL-MCNC: 98 MG/DL (ref 74–106)
HCT VFR BLD AUTO: 38.4 % (ref 38–47)
HDLC SERPL-MCNC: 58 MG/DL (ref 40–60)
HGB BLD-MCNC: 12.1 G/DL (ref 12–16)
IMM GRANULOCYTES # BLD AUTO: 0.02 K/UL (ref 0–0.04)
IMM GRANULOCYTES NFR BLD: 0.3 % (ref 0–0.4)
LDLC SERPL CALC-MCNC: 15 MG/DL
LYMPHOCYTES # BLD AUTO: 1.31 K/UL (ref 1–4.8)
LYMPHOCYTES NFR BLD AUTO: 17.6 % (ref 27–41)
MCH RBC QN AUTO: 30.1 PG (ref 27–31)
MCHC RBC AUTO-ENTMCNC: 31.5 G/DL (ref 32–36)
MCV RBC AUTO: 95.5 FL (ref 80–96)
MONOCYTES # BLD AUTO: 0.77 K/UL (ref 0–0.8)
MONOCYTES NFR BLD AUTO: 10.3 % (ref 2–6)
MPC BLD CALC-MCNC: 9.5 FL (ref 9.4–12.4)
NEUTROPHILS # BLD AUTO: 5.2 K/UL (ref 1.8–7.7)
NEUTROPHILS NFR BLD AUTO: 69.8 % (ref 53–65)
NONHDLC SERPL-MCNC: 44 MG/DL
NRBC # BLD AUTO: 0 X10E3/UL
NRBC, AUTO (.00): 0 %
PLATELET # BLD AUTO: 455 K/UL (ref 150–400)
POTASSIUM SERPL-SCNC: 3.6 MMOL/L (ref 3.5–5.1)
PROT SERPL-MCNC: 7.6 G/DL (ref 6.4–8.2)
RBC # BLD AUTO: 4.02 M/UL (ref 4.2–5.4)
SODIUM SERPL-SCNC: 141 MMOL/L (ref 136–145)
T4 FREE SERPL-MCNC: 0.95 NG/DL (ref 0.76–1.46)
TRIGL SERPL-MCNC: 145 MG/DL (ref 35–150)
TSH SERPL DL<=0.005 MIU/L-ACNC: 3.25 UIU/ML (ref 0.36–3.74)
VLDLC SERPL-MCNC: 29 MG/DL
WBC # BLD AUTO: 7.45 K/UL (ref 4.5–11)

## 2023-07-13 PROCEDURE — 80050 GENERAL HEALTH PANEL: CPT | Mod: ,,, | Performed by: CLINICAL MEDICAL LABORATORY

## 2023-07-13 PROCEDURE — 3288F FALL RISK ASSESSMENT DOCD: CPT | Mod: CPTII,,, | Performed by: FAMILY MEDICINE

## 2023-07-13 PROCEDURE — 3288F PR FALLS RISK ASSESSMENT DOCUMENTED: ICD-10-PCS | Mod: CPTII,,, | Performed by: FAMILY MEDICINE

## 2023-07-13 PROCEDURE — 80061 LIPID PANEL: ICD-10-PCS | Mod: ,,, | Performed by: CLINICAL MEDICAL LABORATORY

## 2023-07-13 PROCEDURE — 4010F PR ACE/ARB THEARPY RXD/TAKEN: ICD-10-PCS | Mod: CPTII,,, | Performed by: FAMILY MEDICINE

## 2023-07-13 PROCEDURE — 4010F ACE/ARB THERAPY RXD/TAKEN: CPT | Mod: CPTII,,, | Performed by: FAMILY MEDICINE

## 2023-07-13 PROCEDURE — 1100F PR PT FALLS ASSESS DOC 2+ FALLS/FALL W/INJURY/YR: ICD-10-PCS | Mod: CPTII,,, | Performed by: FAMILY MEDICINE

## 2023-07-13 PROCEDURE — 84439 T4, FREE: ICD-10-PCS | Mod: ,,, | Performed by: CLINICAL MEDICAL LABORATORY

## 2023-07-13 PROCEDURE — 99214 OFFICE O/P EST MOD 30 MIN: CPT | Mod: ,,, | Performed by: FAMILY MEDICINE

## 2023-07-13 PROCEDURE — 99214 PR OFFICE/OUTPT VISIT, EST, LEVL IV, 30-39 MIN: ICD-10-PCS | Mod: ,,, | Performed by: FAMILY MEDICINE

## 2023-07-13 PROCEDURE — 84439 ASSAY OF FREE THYROXINE: CPT | Mod: ,,, | Performed by: CLINICAL MEDICAL LABORATORY

## 2023-07-13 PROCEDURE — 80061 LIPID PANEL: CPT | Mod: ,,, | Performed by: CLINICAL MEDICAL LABORATORY

## 2023-07-13 PROCEDURE — 1100F PTFALLS ASSESS-DOCD GE2>/YR: CPT | Mod: CPTII,,, | Performed by: FAMILY MEDICINE

## 2023-07-13 PROCEDURE — 80050 PR  GENERAL HEALTH PANEL: ICD-10-PCS | Mod: ,,, | Performed by: CLINICAL MEDICAL LABORATORY

## 2023-07-13 RX ORDER — PAROXETINE HYDROCHLORIDE 40 MG/1
40 TABLET, FILM COATED ORAL DAILY
Qty: 90 TABLET | Refills: 1 | Status: SHIPPED | OUTPATIENT
Start: 2023-07-13 | End: 2024-01-16 | Stop reason: SDUPTHER

## 2023-07-13 RX ORDER — FERROUS SULFATE 325(65) MG
325 TABLET, DELAYED RELEASE (ENTERIC COATED) ORAL 2 TIMES DAILY
Qty: 60 TABLET | Refills: 11 | Status: SHIPPED | OUTPATIENT
Start: 2023-07-13

## 2023-07-13 RX ORDER — ZOLPIDEM TARTRATE 10 MG/1
10 TABLET ORAL NIGHTLY PRN
Qty: 30 TABLET | Refills: 2 | Status: SHIPPED | OUTPATIENT
Start: 2023-07-13 | End: 2023-12-06

## 2023-07-13 RX ORDER — LEVOTHYROXINE SODIUM 50 UG/1
50 TABLET ORAL
Qty: 90 TABLET | Refills: 1 | Status: SHIPPED | OUTPATIENT
Start: 2023-07-13 | End: 2024-02-14 | Stop reason: SDUPTHER

## 2023-07-13 NOTE — PROGRESS NOTES
Vicente Guzmán MD   [facility]  [address] [place] [address]     PATIENT NAME: Anabelle Gaspar  : 1955  DATE: 23  MRN: [id number]      Billing Provider: Vicente Guzmán MD  Level of Service: [place] OFFICE/OUTPT VISIT, EST, LEVL IV, 30-39 MIN  Patient PCP Information       Provider PCP Type    Vicente Guzmán MD General            Reason for Visit / Chief Complaint: Hypertension (Fasting check up and med refills), Hypothyroidism, and OTHER (States she saw neurology and was told they could not find anything wrong. She wants to discuss what her next step should be.)         History of Present Illness / Problem Focused Workflow     Anabelle Gaspar presents to the clinic with Hypertension (Fasting check up and med refills), Hypothyroidism, and OTHER (States she saw neurology and was told they could not find anything wrong. She wants to discuss what her next step should be.)     HPI    Review of Systems     Review of Systems   Constitutional:  Negative for activity change, appetite change, fatigue and fever.   HENT:  Negative for nasal congestion, ear pain, hearing loss, sinus pressure/congestion and sore throat.    Respiratory:  Negative for cough, chest tightness and shortness of breath.    Cardiovascular:  Negative for chest pain and palpitations.   Gastrointestinal:  Negative for abdominal pain and fecal incontinence.   Genitourinary:  Negative for bladder incontinence and difficulty urinating.   Musculoskeletal:  Negative for arthralgias.   Integumentary:  Negative for rash.   Neurological:  Negative for dizziness and headaches.      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     HYSTERECTOMY      NECK SURGERY      SINUS SURGERY      STENTS      X 4       Social History  Anabelle Gaspar  reports that she has been smoking cigarettes. She started smoking about 51 years ago. She has been smoking an average of 2 packs per day. She has never used smokeless tobacco. She reports that she does not currently use alcohol. She reports that she does not use drugs.    Family History  Anabelle Gaspar  family history includes Cancer in her sister; Diabetes in her mother and sister; Heart disease in her mother; Hyperlipidemia in her mother and sister; Hypertension in her mother and sister.    Medications and Allergies     Medications  Outpatient Medications Marked as Taking for the 7/13/23 encounter (Office Visit) with Vicente Guzmán MD   Medication Sig Dispense Refill    amitriptyline (ELAVIL) 100 MG tablet Take 1 tablet by mouth once daily.      aspirin (ECOTRIN) 81 MG EC tablet Take 81 mg by mouth once daily.      butalbitaL-acetaminophen  mg Tab TAKE 1 OR 2 TABLETS BY MOUTH EVERY 4 HOURS AS NEEDED FOR PAIN      CALCIUM CITRATE ORAL Take 1 tablet by mouth once daily.      clopidogreL (PLAVIX) 75 mg tablet Take by mouth.      DULoxetine (CYMBALTA) 60 MG capsule Take 1 capsule by mouth 2 (two) times a day.      ENTRESTO 24-26 mg per tablet 1 tablet 2 (two) times daily.      ergocalciferol, vitamin D2, (VITAMIN D ORAL) Take 1 tablet by mouth once daily.      furosemide (LASIX) 80 MG tablet Take 80 mg by mouth 2 (two) times a day.      gabapentin (NEURONTIN) 300 MG capsule Take 300 mg by mouth 3 (three) times daily.      HYDROcodone-acetaminophen (NORCO)  mg per tablet TAKE 1 TABLET BY MOUTH 4 TIMES A DAY IF NEEDED FOR PAIN.      methocarbamoL (ROBAXIN) 750 MG Tab Take 750 mg by mouth 3 (three) times daily as needed.      metOLazone (ZAROXOLYN) 2.5 MG tablet Take 2.5 mg by mouth daily as needed. Take as  needed for a weight gain of 3+      metoprolol succinate (TOPROL-XL) 50 MG 24 hr tablet Take 1 tablet by mouth once daily.      multivitamin (THERAGRAN) per tablet Take 1 tablet by mouth once daily.      nitroGLYCERIN (NITROSTAT) 0.4 MG SL tablet Place 0.4 mg under the tongue every 5 (five) minutes as needed for Chest pain.      promethazine (PHENERGAN) 25 MG tablet TAKE 1 TABLET BY MOUTH EVERYDAY IF NEEDED      REPATHA SURECLICK 140 mg/mL PnIj Inject into the skin every 14 (fourteen) days.      topiramate (TOPAMAX) 100 MG tablet Take 1,000 mg by mouth nightly.      [DISCONTINUED] ferrous sulfate 325 (65 FE) MG EC tablet Take 1 tablet (325 mg total) by mouth 2 (two) times daily. 60 tablet 11    [DISCONTINUED] levothyroxine (SYNTHROID) 50 MCG tablet Take 1 tablet (50 mcg total) by mouth before breakfast. 90 tablet 0    [DISCONTINUED] paroxetine (PAXIL) 40 MG tablet TAKE 1 TABLET EVERY DAY 90 tablet 1    [DISCONTINUED] zolpidem (AMBIEN) 10 mg Tab Take 1 tablet (10 mg total) by mouth nightly as needed (sleep). 30 tablet 0       Allergies  Review of patient's allergies indicates:   Allergen Reactions    Levaquin [levofloxacin] Swelling    Dexamethasone Edema and Other (See Comments)    Prednisone Other (See Comments)       Physical Examination   There were no vitals filed for this visit.  Physical Exam     Assessment and Plan (including Health Maintenance)   :    Plan:         Health Maintenance Due   Topic Date Due    Hepatitis C Screening  Never done    Pneumococcal Vaccines (Age 65+) (1 - PCV) Never done    TETANUS VACCINE  Never done    DEXA Scan  Never done    Shingles Vaccine (1 of 2) Never done    Pap Smear  01/29/2017    COVID-19 Vaccine (2 - Moderna series) 10/12/2021    Mammogram  07/28/2023       Problem List Items Addressed This Visit          Cardiac/Vascular    Hypertension    Relevant Orders    CBC Auto Differential    Comprehensive Metabolic Panel    Lipid Panel       Endocrine    Hypothyroidism -  Primary    Relevant Orders    TSH    T4, Free     Other Visit Diagnoses       Syncope, unspecified syncope type        Weakness of both lower extremities        Low back pain, unspecified back pain laterality, unspecified chronicity, unspecified whether sciatica present              Hypothyroidism, unspecified type  -     TSH; Future; Expected date: 07/13/2023  -     T4, Free; Future; Expected date: 07/13/2023    Hypertension, unspecified type  -     CBC Auto Differential; Future; Expected date: 07/13/2023  -     Comprehensive Metabolic Panel; Future; Expected date: 07/13/2023  -     Lipid Panel; Future; Expected date: 07/13/2023    Syncope, unspecified syncope type    Weakness of both lower extremities    Low back pain, unspecified back pain laterality, unspecified chronicity, unspecified whether sciatica present    Other orders  -     paroxetine (PAXIL) 40 MG tablet; Take 1 tablet (40 mg total) by mouth once daily.  Dispense: 90 tablet; Refill: 1  -     ferrous sulfate 325 (65 FE) MG EC tablet; Take 1 tablet (325 mg total) by mouth 2 (two) times daily.  Dispense: 60 tablet; Refill: 11  -     levothyroxine (SYNTHROID) 50 MCG tablet; Take 1 tablet (50 mcg total) by mouth before breakfast.  Dispense: 90 tablet; Refill: 1  -     zolpidem (AMBIEN) 10 mg Tab; Take 1 tablet (10 mg total) by mouth nightly as needed (sleep).  Dispense: 30 tablet; Refill: 2       Health Maintenance Topics with due status: Not Due       Topic Last Completion Date    Colorectal Cancer Screening 07/03/2019    Influenza Vaccine 10/29/2019    Lipid Panel 12/12/2022    Aspirin/Antiplatelet Therapy 06/09/2023       Procedures     Future Appointments   Date Time Provider Department Center   9/28/2023  [facility]        No follow-ups on file.       Signature:  Vicente Guzmán MD  [facility]  [address] [place] [address]  [phone] Phone  [phone] Fax    Date of  encounter: 7/13/23

## 2023-09-28 ENCOUNTER — HOSPITAL ENCOUNTER (OUTPATIENT)
Dept: RADIOLOGY | Facility: HOSPITAL | Age: 68
Discharge: HOME | End: 2023-09-28
Attending: RADIOLOGY
Payer: COMMERCIAL

## 2023-09-28 DIAGNOSIS — Z12.31 VISIT FOR SCREENING MAMMOGRAM: ICD-10-CM

## 2023-09-28 PROCEDURE — 77067 MAMMO DIGITAL SCREENING BILAT: ICD-10-PCS | Mod: 26,,, | Performed by: RADIOLOGY

## 2023-09-28 PROCEDURE — 77067 SCR MAMMO BI INCL CAD: CPT | Mod: 26,,, | Performed by: RADIOLOGY

## 2023-09-28 PROCEDURE — 77067 SCR MAMMO BI INCL CAD: CPT | Mod: TC

## 2023-12-06 RX ORDER — ZOLPIDEM TARTRATE 10 MG/1
TABLET ORAL
Qty: 30 TABLET | Refills: 0 | Status: SHIPPED | OUTPATIENT
Start: 2023-12-06 | End: 2024-02-14 | Stop reason: SDUPTHER

## 2023-12-06 NOTE — TELEPHONE ENCOUNTER
----- Message from Pat Blake sent at 12/6/2023 12:01 PM CST -----  Pt is requesting refill on Ambien to be sent to [facility]

## 2023-12-27 RX ORDER — AZITHROMYCIN 250 MG/1
TABLET, FILM COATED ORAL
Qty: 6 TABLET | Refills: 0 | Status: SHIPPED | OUTPATIENT
Start: 2023-12-27 | End: 2024-01-01

## 2024-01-16 RX ORDER — PAROXETINE HYDROCHLORIDE 40 MG/1
40 TABLET, FILM COATED ORAL DAILY
Qty: 30 TABLET | Refills: 0 | Status: SHIPPED | OUTPATIENT
Start: 2024-01-16 | End: 2024-02-14 | Stop reason: SDUPTHER

## 2024-01-16 NOTE — TELEPHONE ENCOUNTER
----- Message from Janelle Dunaway sent at 1/16/2024 10:49 AM CST -----  Paxil refill. [facility] [phone]

## 2024-02-14 ENCOUNTER — OFFICE VISIT (OUTPATIENT)
Dept: FAMILY MEDICINE | Facility: CLINIC | Age: 69
End: 2024-02-14
Payer: COMMERCIAL

## 2024-02-14 VITALS
DIASTOLIC BLOOD PRESSURE: 52 MMHG | OXYGEN SATURATION: 98 % | TEMPERATURE: 98 F | WEIGHT: 124.38 LBS | SYSTOLIC BLOOD PRESSURE: 108 MMHG | HEART RATE: 70 BPM | BODY MASS INDEX: 21.24 KG/M2 | HEIGHT: 64 IN

## 2024-02-14 DIAGNOSIS — E03.9 HYPOTHYROIDISM, UNSPECIFIED TYPE: ICD-10-CM

## 2024-02-14 DIAGNOSIS — E78.5 HYPERLIPIDEMIA, UNSPECIFIED HYPERLIPIDEMIA TYPE: Primary | ICD-10-CM

## 2024-02-14 DIAGNOSIS — I10 HYPERTENSION, UNSPECIFIED TYPE: ICD-10-CM

## 2024-02-14 LAB
ALBUMIN SERPL BCP-MCNC: 3 G/DL (ref 3.5–5)
ALBUMIN/GLOB SERPL: 0.7 {RATIO}
ALP SERPL-CCNC: 137 U/L (ref 55–142)
ALT SERPL W P-5'-P-CCNC: 23 U/L (ref 13–56)
ANION GAP SERPL CALCULATED.3IONS-SCNC: 9 MMOL/L (ref 7–16)
AST SERPL W P-5'-P-CCNC: 22 U/L (ref 15–37)
BASOPHILS # BLD AUTO: 0.05 K/UL (ref 0–0.2)
BASOPHILS NFR BLD AUTO: 0.8 % (ref 0–1)
BILIRUB SERPL-MCNC: 0.4 MG/DL (ref ?–1.2)
BUN SERPL-MCNC: 15 MG/DL (ref 7–18)
BUN/CREAT SERPL: 11 (ref 6–20)
CALCIUM SERPL-MCNC: 9 MG/DL (ref 8.5–10.1)
CHLORIDE SERPL-SCNC: 107 MMOL/L (ref 98–107)
CHOLEST SERPL-MCNC: 161 MG/DL (ref 0–200)
CHOLEST/HDLC SERPL: 3.9 {RATIO}
CO2 SERPL-SCNC: 28 MMOL/L (ref 21–32)
CREAT SERPL-MCNC: 1.36 MG/DL (ref 0.55–1.02)
DIFFERENTIAL METHOD BLD: ABNORMAL
EGFR (NO RACE VARIABLE) (RUSH/TITUS): 43 ML/MIN/1.73M2
EOSINOPHIL # BLD AUTO: 0.13 K/UL (ref 0–0.5)
EOSINOPHIL NFR BLD AUTO: 2 % (ref 1–4)
ERYTHROCYTE [DISTWIDTH] IN BLOOD BY AUTOMATED COUNT: 15 % (ref 11.5–14.5)
GLOBULIN SER-MCNC: 4.3 G/DL (ref 2–4)
GLUCOSE SERPL-MCNC: 91 MG/DL (ref 74–106)
HCT VFR BLD AUTO: 38.1 % (ref 38–47)
HDLC SERPL-MCNC: 41 MG/DL (ref 40–60)
HGB BLD-MCNC: 12 G/DL (ref 12–16)
IMM GRANULOCYTES # BLD AUTO: 0.03 K/UL (ref 0–0.04)
IMM GRANULOCYTES NFR BLD: 0.5 % (ref 0–0.4)
LDLC SERPL CALC-MCNC: 89 MG/DL
LDLC/HDLC SERPL: 2.2 {RATIO}
LYMPHOCYTES # BLD AUTO: 1.16 K/UL (ref 1–4.8)
LYMPHOCYTES NFR BLD AUTO: 17.6 % (ref 27–41)
MCH RBC QN AUTO: 30.9 PG (ref 27–31)
MCHC RBC AUTO-ENTMCNC: 31.5 G/DL (ref 32–36)
MCV RBC AUTO: 98.2 FL (ref 80–96)
MONOCYTES # BLD AUTO: 0.83 K/UL (ref 0–0.8)
MONOCYTES NFR BLD AUTO: 12.6 % (ref 2–6)
MPC BLD CALC-MCNC: 9.7 FL (ref 9.4–12.4)
NEUTROPHILS # BLD AUTO: 4.38 K/UL (ref 1.8–7.7)
NEUTROPHILS NFR BLD AUTO: 66.5 % (ref 53–65)
NONHDLC SERPL-MCNC: 120 MG/DL
NRBC # BLD AUTO: 0 X10E3/UL
NRBC, AUTO (.00): 0 %
PLATELET # BLD AUTO: 378 K/UL (ref 150–400)
POTASSIUM SERPL-SCNC: 4.5 MMOL/L (ref 3.5–5.1)
PROT SERPL-MCNC: 7.3 G/DL (ref 6.4–8.2)
RBC # BLD AUTO: 3.88 M/UL (ref 4.2–5.4)
SODIUM SERPL-SCNC: 139 MMOL/L (ref 136–145)
T4 FREE SERPL-MCNC: 0.71 NG/DL (ref 0.76–1.46)
TRIGL SERPL-MCNC: 156 MG/DL (ref 35–150)
TSH SERPL DL<=0.005 MIU/L-ACNC: 8.64 UIU/ML (ref 0.36–3.74)
VLDLC SERPL-MCNC: 31 MG/DL
WBC # BLD AUTO: 6.58 K/UL (ref 4.5–11)

## 2024-02-14 PROCEDURE — 80061 LIPID PANEL: CPT | Mod: ,,, | Performed by: CLINICAL MEDICAL LABORATORY

## 2024-02-14 PROCEDURE — 1101F PT FALLS ASSESS-DOCD LE1/YR: CPT | Mod: CPTII,,, | Performed by: FAMILY MEDICINE

## 2024-02-14 PROCEDURE — 3288F FALL RISK ASSESSMENT DOCD: CPT | Mod: CPTII,,, | Performed by: FAMILY MEDICINE

## 2024-02-14 PROCEDURE — 4010F ACE/ARB THERAPY RXD/TAKEN: CPT | Mod: CPTII,,, | Performed by: FAMILY MEDICINE

## 2024-02-14 PROCEDURE — 3074F SYST BP LT 130 MM HG: CPT | Mod: CPTII,,, | Performed by: FAMILY MEDICINE

## 2024-02-14 PROCEDURE — 80050 GENERAL HEALTH PANEL: CPT | Mod: ,,, | Performed by: CLINICAL MEDICAL LABORATORY

## 2024-02-14 PROCEDURE — 3008F BODY MASS INDEX DOCD: CPT | Mod: CPTII,,, | Performed by: FAMILY MEDICINE

## 2024-02-14 PROCEDURE — 1159F MED LIST DOCD IN RCRD: CPT | Mod: CPTII,,, | Performed by: FAMILY MEDICINE

## 2024-02-14 PROCEDURE — 99214 OFFICE O/P EST MOD 30 MIN: CPT | Mod: ,,, | Performed by: FAMILY MEDICINE

## 2024-02-14 PROCEDURE — 3078F DIAST BP <80 MM HG: CPT | Mod: CPTII,,, | Performed by: FAMILY MEDICINE

## 2024-02-14 PROCEDURE — 84439 ASSAY OF FREE THYROXINE: CPT | Mod: ,,, | Performed by: CLINICAL MEDICAL LABORATORY

## 2024-02-14 RX ORDER — LEVOTHYROXINE SODIUM 50 UG/1
50 TABLET ORAL
Qty: 30 TABLET | Refills: 0 | Status: SHIPPED | OUTPATIENT
Start: 2024-02-14 | End: 2024-02-15 | Stop reason: SDUPTHER

## 2024-02-14 RX ORDER — ZOLPIDEM TARTRATE 10 MG/1
TABLET ORAL
Qty: 30 TABLET | Refills: 0 | Status: SHIPPED | OUTPATIENT
Start: 2024-02-14 | End: 2024-04-18 | Stop reason: SDUPTHER

## 2024-02-14 RX ORDER — PAROXETINE HYDROCHLORIDE 40 MG/1
40 TABLET, FILM COATED ORAL DAILY
Qty: 30 TABLET | Refills: 0 | Status: SHIPPED | OUTPATIENT
Start: 2024-02-14 | End: 2024-02-15 | Stop reason: SDUPTHER

## 2024-02-14 RX ORDER — VALSARTAN 40 MG/1
40 TABLET ORAL 2 TIMES DAILY
COMMUNITY
Start: 2024-01-16

## 2024-02-14 NOTE — PROGRESS NOTES
Vicente Guzmán MD   [facility]  [address] [place] [address]     PATIENT NAME: Anabelle Gaspar  : 1955  DATE: 24  MRN: [id number]      Billing Provider: Vicente Guzmán MD  Level of Service:   Patient PCP Information       Provider PCP Type    Vicente Guzmán MD General            Reason for Visit / Chief Complaint: Hypothyroidism (Check up, labs, refills )         History of Present Illness / Problem Focused Workflow     Anabelle Gaspar presents to the clinic with Hypothyroidism (Check up, labs, refills )     HPI    Review of Systems     Review of Systems   Constitutional:  Negative for activity change, appetite change, fatigue and fever.   HENT:  Negative for nasal congestion, ear pain, hearing loss, sinus pressure/congestion and sore throat.    Respiratory:  Negative for cough, chest tightness and shortness of breath.    Cardiovascular:  Negative for chest pain and palpitations.   Gastrointestinal:  Negative for abdominal pain and fecal incontinence.   Genitourinary:  Negative for bladder incontinence and difficulty urinating.   Musculoskeletal:  Negative for arthralgias.   Integumentary:  Negative for rash.   Neurological:  Negative for dizziness and headaches.        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     HYSTERECTOMY      NECK SURGERY      SINUS SURGERY      STENTS      X 4       Social History  Anabelle Gaspar  reports that she has been smoking cigarettes. She started smoking about 52 years ago. She has a 104.2 pack-year smoking history. She has never used smokeless  tobacco. She reports that she does not currently use alcohol. She reports that she does not use drugs.    Family History  Anabelle Gaspar  family history includes Cancer in her sister; Diabetes in her mother and sister; Heart disease in her mother; Hyperlipidemia in her mother and sister; Hypertension in her mother and sister.    Medications and Allergies     Medications  Outpatient Medications Marked as Taking for the 2/14/24 encounter (Office Visit) with Vicente Guzmán MD   Medication Sig Dispense Refill    amitriptyline (ELAVIL) 100 MG tablet Take 1 tablet by mouth once daily.      aspirin (ECOTRIN) 81 MG EC tablet Take 81 mg by mouth once daily.      butalbitaL-acetaminophen  mg Tab TAKE 1 OR 2 TABLETS BY MOUTH EVERY 4 HOURS AS NEEDED FOR PAIN      CALCIUM CITRATE ORAL Take 1 tablet by mouth once daily.      clopidogreL (PLAVIX) 75 mg tablet Take by mouth.      DULoxetine (CYMBALTA) 60 MG capsule Take 1 capsule by mouth 2 (two) times a day.      ergocalciferol, vitamin D2, (VITAMIN D ORAL) Take 1 tablet by mouth once daily.      ferrous sulfate 325 (65 FE) MG EC tablet Take 1 tablet (325 mg total) by mouth 2 (two) times daily. 60 tablet 11    furosemide (LASIX) 80 MG tablet Take 80 mg by mouth 2 (two) times a day.      gabapentin (NEURONTIN) 300 MG capsule Take 300 mg by mouth 3 (three) times daily.      HYDROcodone-acetaminophen (NORCO)  mg per tablet TAKE 1 TABLET BY MOUTH 4 TIMES A DAY IF NEEDED FOR PAIN.      isosorbide mononitrate (IMDUR) 60 MG 24 hr tablet Take 60 mg by mouth 2 (two) times daily.      methocarbamoL (ROBAXIN) 750 MG Tab Take 750 mg by mouth 3 (three) times daily as needed.      metOLazone (ZAROXOLYN) 2.5 MG tablet Take 2.5 mg by mouth daily as needed. Take as needed for a weight gain of 3+      metoprolol succinate (TOPROL-XL) 50 MG 24 hr tablet Take 1 tablet by mouth once daily.      multivitamin (THERAGRAN) per tablet Take 1 tablet by mouth once daily.      nitroGLYCERIN  (NITROSTAT) 0.4 MG SL tablet Place 0.4 mg under the tongue every 5 (five) minutes as needed for Chest pain.      potassium chloride SA (K-DUR,KLOR-CON) 20 MEQ tablet Take 1 tablet by mouth 2 (two) times a day.      promethazine (PHENERGAN) 25 MG tablet TAKE 1 TABLET BY MOUTH EVERYDAY IF NEEDED      topiramate (TOPAMAX) 100 MG tablet Take 1,000 mg by mouth nightly.      valsartan (DIOVAN) 40 MG tablet Take 40 mg by mouth 2 (two) times daily.      [DISCONTINUED] levothyroxine (SYNTHROID) 50 MCG tablet Take 1 tablet (50 mcg total) by mouth before breakfast. 90 tablet 1    [DISCONTINUED] paroxetine (PAXIL) 40 MG tablet Take 1 tablet (40 mg total) by mouth once daily. 30 tablet 0    [DISCONTINUED] zolpidem (AMBIEN) 10 mg Tab TAKE 1 TABLET AT BEDTIME IF NEEDED FOR SLEEP... 30 tablet 0       Allergies  Review of patient's allergies indicates:   Allergen Reactions    Levaquin [levofloxacin] Swelling    Dexamethasone Edema and Other (See Comments)    Prednisone Other (See Comments)       Physical Examination     Vitals:    02/14/24 1009   BP: (!) 108/52   Pulse: 70   Temp: 98 °F (36.7 °C)     Physical Exam  Constitutional:       General: She is not in acute distress.     Appearance: She is not ill-appearing.   HENT:      Head: Normocephalic and atraumatic.      Right Ear: Tympanic membrane and ear canal normal.      Left Ear: Tympanic membrane and ear canal normal.      Nose: Nose normal. No congestion or rhinorrhea.   Eyes:      Pupils: Pupils are equal, round, and reactive to light.   Cardiovascular:      Rate and Rhythm: Normal rate and regular rhythm.      Pulses: Normal pulses.      Heart sounds: No murmur heard.  Pulmonary:      Effort: No respiratory distress.      Breath sounds: No wheezing, rhonchi or rales.   Abdominal:      General: Bowel sounds are normal.      Palpations: Abdomen is soft.      Tenderness: There is no abdominal tenderness.      Hernia: No hernia is present.   Musculoskeletal:      Cervical back:  Normal range of motion and neck supple.   Lymphadenopathy:      Cervical: No cervical adenopathy.   Skin:     General: Skin is warm and dry.   Neurological:      Mental Status: She is alert.   Psychiatric:         Behavior: Behavior normal.         Thought Content: Thought content normal.          Assessment and Plan (including Health Maintenance)   :    Plan:         Health Maintenance Due   Topic Date Due    Hepatitis C Screening  Never done    Pneumococcal Vaccines (Age 65+) (1 of 2 - PCV) Never done    TETANUS VACCINE  Never done    DEXA Scan  Never done    Shingles Vaccine (1 of 2) Never done    RSV Vaccine (Age 60+ and Pregnant patients) (1 - 1-dose 60+ series) Never done    Pap Smear  01/29/2017    Influenza Vaccine (1) 09/01/2023    COVID-19 Vaccine (2 - 2023-24 season) 09/01/2023    LDCT Lung Screen  12/21/2023       Problem List Items Addressed This Visit          Cardiac/Vascular    Hyperlipidemia - Primary    Relevant Orders    CBC Auto Differential    Comprehensive Metabolic Panel    Lipid Panel    Hypertension    Relevant Orders    CBC Auto Differential    Comprehensive Metabolic Panel       Endocrine    Hypothyroidism    Relevant Orders    T4, Free    TSH     Hyperlipidemia, unspecified hyperlipidemia type  -     CBC Auto Differential; Future; Expected date: 02/14/2024  -     Comprehensive Metabolic Panel; Future; Expected date: 02/14/2024  -     Lipid Panel; Future; Expected date: 02/14/2024    Hypertension, unspecified type  -     CBC Auto Differential; Future; Expected date: 02/14/2024  -     Comprehensive Metabolic Panel; Future; Expected date: 02/14/2024    Hypothyroidism, unspecified type  -     T4, Free; Future; Expected date: 02/14/2024  -     TSH; Future; Expected date: 02/14/2024    Other orders  -     zolpidem (AMBIEN) 10 mg Tab; TAKE 1 TABLET AT BEDTIME IF NEEDED FOR SLEEP...  Dispense: 30 tablet; Refill: 0  -     paroxetine (PAXIL) 40 MG tablet; Take 1 tablet (40 mg total) by mouth once  daily.  Dispense: 30 tablet; Refill: 0  -     levothyroxine (SYNTHROID) 50 MCG tablet; Take 1 tablet (50 mcg total) by mouth before breakfast.  Dispense: 30 tablet; Refill: 0       Health Maintenance Topics with due status: Not Due       Topic Last Completion Date    Colorectal Cancer Screening 07/03/2019    Lipid Panel 07/13/2023    Mammogram 09/28/2023    Aspirin/Antiplatelet Therapy 02/14/2024       Procedures     Future Appointments   Date Time Provider Department Center   11/4/2024  2:40 PM [facility] M[facility]MO1 [facility]        No follow-ups on file.       Signature:  Vicente Guzmán MD  [facility]  [address] [place] [address]  [phone] Phone  [phone] Fax    Date of encounter: 2/14/24

## 2024-02-15 RX ORDER — LEVOTHYROXINE SODIUM 50 UG/1
50 TABLET ORAL
Qty: 90 TABLET | Refills: 1 | Status: SHIPPED | OUTPATIENT
Start: 2024-02-15 | End: 2024-02-26

## 2024-02-15 RX ORDER — PAROXETINE HYDROCHLORIDE 40 MG/1
40 TABLET, FILM COATED ORAL DAILY
Qty: 90 TABLET | Refills: 1 | Status: SHIPPED | OUTPATIENT
Start: 2024-02-15

## 2024-02-26 ENCOUNTER — OFFICE VISIT (OUTPATIENT)
Dept: FAMILY MEDICINE | Facility: CLINIC | Age: 69
End: 2024-02-26
Payer: COMMERCIAL

## 2024-02-26 VITALS
DIASTOLIC BLOOD PRESSURE: 62 MMHG | SYSTOLIC BLOOD PRESSURE: 110 MMHG | HEART RATE: 61 BPM | HEIGHT: 64 IN | WEIGHT: 127.19 LBS | OXYGEN SATURATION: 97 % | TEMPERATURE: 99 F | BODY MASS INDEX: 21.71 KG/M2

## 2024-02-26 DIAGNOSIS — R55 SYNCOPE, UNSPECIFIED SYNCOPE TYPE: Primary | ICD-10-CM

## 2024-02-26 DIAGNOSIS — R25.1 TREMOR: ICD-10-CM

## 2024-02-26 PROCEDURE — 99213 OFFICE O/P EST LOW 20 MIN: CPT | Mod: ,,, | Performed by: FAMILY MEDICINE

## 2024-02-26 PROCEDURE — 3078F DIAST BP <80 MM HG: CPT | Mod: CPTII,,, | Performed by: FAMILY MEDICINE

## 2024-02-26 PROCEDURE — 1159F MED LIST DOCD IN RCRD: CPT | Mod: CPTII,,, | Performed by: FAMILY MEDICINE

## 2024-02-26 PROCEDURE — 4010F ACE/ARB THERAPY RXD/TAKEN: CPT | Mod: CPTII,,, | Performed by: FAMILY MEDICINE

## 2024-02-26 PROCEDURE — 3288F FALL RISK ASSESSMENT DOCD: CPT | Mod: CPTII,,, | Performed by: FAMILY MEDICINE

## 2024-02-26 PROCEDURE — 3074F SYST BP LT 130 MM HG: CPT | Mod: CPTII,,, | Performed by: FAMILY MEDICINE

## 2024-02-26 PROCEDURE — 1100F PTFALLS ASSESS-DOCD GE2>/YR: CPT | Mod: CPTII,,, | Performed by: FAMILY MEDICINE

## 2024-02-26 PROCEDURE — 3008F BODY MASS INDEX DOCD: CPT | Mod: CPTII,,, | Performed by: FAMILY MEDICINE

## 2024-02-26 RX ORDER — LEVOTHYROXINE SODIUM 75 UG/1
75 TABLET ORAL
Qty: 30 TABLET | Refills: 11 | Status: SHIPPED | OUTPATIENT
Start: 2024-02-26 | End: 2025-02-25

## 2024-02-26 RX ORDER — RANOLAZINE 500 MG/1
500 TABLET, EXTENDED RELEASE ORAL 2 TIMES DAILY
COMMUNITY

## 2024-02-28 NOTE — PROGRESS NOTES
Vicente Guzmán MD   [facility]  [address] [place] [address]     PATIENT NAME: Anabelle Gaspar  : 1955  DATE: 24  MRN: [id number]      Billing Provider: Vicente Guzmán MD  Level of Service: [place] OFFICE/OUTPT VISIT, EST, LEVL III, 20-29 MIN  Patient PCP Information       Provider PCP Type    Vicente Guzmán MD General            Reason for Visit / Chief Complaint: Dizziness (Patient states she is having dizzy spells that come and go. Patient states she will loose her balance and has fallen twice in the past month. Patient states it just comes on all the sudden has been happening 2-3x a week and lasts about a minute.)         History of Present Illness / Problem Focused Workflow     Anabelle Gsapar presents to the clinic with Dizziness (Patient states she is having dizzy spells that come and go. Patient states she will loose her balance and has fallen twice in the past month. Patient states it just comes on all the sudden has been happening 2-3x a week and lasts about a minute.)     HPI    Review of Systems     Review of Systems   Constitutional:  Negative for activity change, appetite change, fatigue and fever.   HENT:  Negative for nasal congestion, ear pain, hearing loss, sinus pressure/congestion and sore throat.    Respiratory:  Negative for cough, chest tightness and shortness of breath.    Cardiovascular:  Negative for chest pain and palpitations.   Gastrointestinal:  Negative for abdominal pain and fecal incontinence.   Genitourinary:  Negative for bladder incontinence and difficulty urinating.   Musculoskeletal:  Negative for arthralgias.   Integumentary:  Negative for rash.   Neurological:  Positive for vertigo, tremors and weakness. Negative for dizziness and headaches.        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     HYSTERECTOMY      NECK SURGERY      SINUS SURGERY      STENTS      X 4       Social History  Anabelle Gaspar  reports that she has been smoking cigarettes. She started smoking about 52 years ago. She has a 104.3 pack-year smoking history. She has never used smokeless tobacco. She reports that she does not currently use alcohol. She reports that she does not use drugs.    Family History  Anabelle Gaspar  family history includes Cancer in her sister; Diabetes in her mother and sister; Heart disease in her mother; Hyperlipidemia in her mother and sister; Hypertension in her mother and sister.    Medications and Allergies     Medications  Outpatient Medications Marked as Taking for the 2/26/24 encounter (Office Visit) with Vicente Guzmán MD   Medication Sig Dispense Refill    amitriptyline (ELAVIL) 100 MG tablet Take 1 tablet by mouth once daily.      aspirin (ECOTRIN) 81 MG EC tablet Take 81 mg by mouth once daily.      butalbitaL-acetaminophen  mg Tab TAKE 1 OR 2 TABLETS BY MOUTH EVERY 4 HOURS AS NEEDED FOR PAIN      CALCIUM CITRATE ORAL Take 1 tablet by mouth once daily.      clopidogreL (PLAVIX) 75 mg tablet Take by mouth.      DULoxetine (CYMBALTA) 60 MG capsule Take 1 capsule by mouth 2 (two) times a day.      ferrous sulfate 325 (65 FE) MG EC tablet Take 1 tablet (325 mg total) by mouth 2 (two) times daily. 60 tablet 11    furosemide (LASIX) 80 MG tablet Take 80 mg by mouth 2 (two) times a day.      gabapentin (NEURONTIN) 300 MG capsule Take 300 mg by mouth 3 (three) times daily.      HYDROcodone-acetaminophen (NORCO)  mg per tablet TAKE 1 TABLET BY MOUTH 4 TIMES A DAY IF NEEDED FOR PAIN.      methocarbamoL (ROBAXIN) 750 MG Tab Take 750 mg by mouth  3 (three) times daily as needed.      metOLazone (ZAROXOLYN) 2.5 MG tablet Take 2.5 mg by mouth daily as needed. Take as needed for a weight gain of 3+      metoprolol succinate (TOPROL-XL) 50 MG 24 hr tablet Take 1 tablet by mouth once daily.      multivitamin (THERAGRAN) per tablet Take 1 tablet by mouth once daily.      nitroGLYCERIN (NITROSTAT) 0.4 MG SL tablet Place 0.4 mg under the tongue every 5 (five) minutes as needed for Chest pain.      paroxetine (PAXIL) 40 MG tablet Take 1 tablet (40 mg total) by mouth once daily. 90 tablet 1    potassium chloride SA (K-DUR,KLOR-CON) 20 MEQ tablet Take 1 tablet by mouth 2 (two) times a day.      promethazine (PHENERGAN) 25 MG tablet TAKE 1 TABLET BY MOUTH EVERYDAY IF NEEDED      ranolazine (RANEXA) 500 MG Tb12 Take 500 mg by mouth 2 (two) times daily.      topiramate (TOPAMAX) 100 MG tablet Take 1,000 mg by mouth nightly.      valsartan (DIOVAN) 40 MG tablet Take 40 mg by mouth 2 (two) times daily.      zolpidem (AMBIEN) 10 mg Tab TAKE 1 TABLET AT BEDTIME IF NEEDED FOR SLEEP... 30 tablet 0    [DISCONTINUED] levothyroxine (SYNTHROID) 50 MCG tablet Take 1 tablet (50 mcg total) by mouth before breakfast. 90 tablet 1       Allergies  Review of patient's allergies indicates:   Allergen Reactions    Levaquin [levofloxacin] Swelling    Dexamethasone Edema and Other (See Comments)    Prednisone Other (See Comments)       Physical Examination     Vitals:    02/26/24 1548   BP: 110/62   Pulse: 61   Temp: 98.7 °F (37.1 °C)     Physical Exam  Constitutional:       General: She is not in acute distress.     Appearance: She is not ill-appearing.   HENT:      Head: Normocephalic and atraumatic.      Right Ear: Tympanic membrane and ear canal normal.      Left Ear: Tympanic membrane and ear canal normal.      Nose: Nose normal. No congestion or rhinorrhea.   Eyes:      Pupils: Pupils are equal, round, and reactive to light.   Cardiovascular:      Rate and Rhythm: Normal rate and  regular rhythm.      Pulses: Normal pulses.      Heart sounds: No murmur heard.  Pulmonary:      Effort: No respiratory distress.      Breath sounds: No wheezing, rhonchi or rales.   Abdominal:      General: Bowel sounds are normal.      Palpations: Abdomen is soft.      Tenderness: There is no abdominal tenderness.      Hernia: No hernia is present.   Musculoskeletal:      Cervical back: Normal range of motion and neck supple.   Lymphadenopathy:      Cervical: No cervical adenopathy.   Skin:     General: Skin is warm and dry.   Neurological:      Mental Status: She is alert.   Psychiatric:         Behavior: Behavior normal.         Thought Content: Thought content normal.          Assessment and Plan (including Health Maintenance)   :    Plan:         Health Maintenance Due   Topic Date Due    Hepatitis C Screening  Never done    Pneumococcal Vaccines (Age 65+) (1 of 2 - PCV) Never done    TETANUS VACCINE  Never done    DEXA Scan  Never done    Shingles Vaccine (1 of 2) Never done    RSV Vaccine (Age 60+ and Pregnant patients) (1 - 1-dose 60+ series) Never done    Pap Smear  01/29/2017    Influenza Vaccine (1) 09/01/2023    COVID-19 Vaccine (2 - 2023-24 season) 09/01/2023    LDCT Lung Screen  12/21/2023       Problem List Items Addressed This Visit    None  Visit Diagnoses       Syncope, unspecified syncope type    -  Primary    Relevant Orders    Ambulatory referral/consult to Neurology    Tremor        Relevant Orders    Ambulatory referral/consult to Neurology          Syncope, unspecified syncope type  -     Ambulatory referral/consult to Neurology; Future; Expected date: 03/04/2024    Tremor  -     Ambulatory referral/consult to Neurology; Future; Expected date: 03/04/2024    Other orders  -     levothyroxine (SYNTHROID) 75 MCG tablet; Take 1 tablet (75 mcg total) by mouth before breakfast.  Dispense: 30 tablet; Refill: 11       Health Maintenance Topics with due status: Not Due       Topic Last Completion  Date    Colorectal Cancer Screening 07/03/2019    Mammogram 09/28/2023    Lipid Panel 02/14/2024    Aspirin/Antiplatelet Therapy 02/26/2024       Procedures     Future Appointments   Date Time Provider Department Center   3/14/2024  3:30 PM Hank Bunn MD [facility]   11/4/2024  2:[facility]        No follow-ups on file.       Signature:  Vicente Guzmán MD  [facility]  [address] [place] [address]  [phone] Phone  [phone] Fax    Date of encounter: 2/26/24

## 2024-02-29 ENCOUNTER — TELEPHONE (OUTPATIENT)
Dept: NEUROLOGY | Facility: CLINIC | Age: 69
End: 2024-02-29
Payer: MEDICARE

## 2024-03-19 ENCOUNTER — TELEPHONE (OUTPATIENT)
Dept: FAMILY MEDICINE | Facility: CLINIC | Age: 69
End: 2024-03-19
Payer: MEDICARE

## 2024-03-19 NOTE — TELEPHONE ENCOUNTER
----- Message from Janelle Dunaway sent at 3/19/2024  3:49 PM CDT -----  Paxil refill, [place]. PT is also wanting a 90 supply of Paxil sent to [facility]. [phone]

## 2024-03-19 NOTE — TELEPHONE ENCOUNTER
Rx sent to mail order on 02/15/2024- 6 month supply. Patient verbalized understanding. Med sent to local pharmacy until med comes in mail order.

## 2024-04-18 RX ORDER — ZOLPIDEM TARTRATE 10 MG/1
TABLET ORAL
Qty: 30 TABLET | Refills: 0 | Status: SHIPPED | OUTPATIENT
Start: 2024-04-18 | End: 2024-05-22 | Stop reason: SDUPTHER

## 2024-04-18 NOTE — TELEPHONE ENCOUNTER
----- Message from Beverly Garcia sent at 4/18/2024  3:29 PM CDT -----  Regarding: REFILL  REQUESTING REFILL ON ZOLPIDEM BE SENT IN TO [facility]. CALL BACK NUMBER IS [phone].

## 2024-05-22 NOTE — TELEPHONE ENCOUNTER
----- Message from Janelle Dunaway sent at 5/22/2024 11:12 AM CDT -----  Ambien refill. [facility]. [phone]

## 2024-05-23 RX ORDER — ZOLPIDEM TARTRATE 10 MG/1
TABLET ORAL
Qty: 30 TABLET | Refills: 0 | Status: SHIPPED | OUTPATIENT
Start: 2024-05-23

## 2024-06-06 RX ORDER — AMOXICILLIN 500 MG/1
500 CAPSULE ORAL 2 TIMES DAILY
COMMUNITY
End: 2024-06-06 | Stop reason: SDUPTHER

## 2024-06-06 RX ORDER — AMOXICILLIN 500 MG/1
500 CAPSULE ORAL 2 TIMES DAILY
Qty: 20 CAPSULE | Refills: 0 | Status: SHIPPED | OUTPATIENT
Start: 2024-06-06 | End: 2024-06-16

## 2024-06-09 DIAGNOSIS — Z71.89 COMPLEX CARE COORDINATION: ICD-10-CM

## 2024-07-11 RX ORDER — ZOLPIDEM TARTRATE 10 MG/1
TABLET ORAL
Qty: 30 TABLET | Refills: 0 | Status: SHIPPED | OUTPATIENT
Start: 2024-07-11

## 2024-07-11 NOTE — TELEPHONE ENCOUNTER
----- Message from Janelle Dunaway sent at 7/11/2024 12:11 PM CDT -----  Ambien refill. [facility]

## 2024-08-20 ENCOUNTER — OFFICE VISIT (OUTPATIENT)
Dept: FAMILY MEDICINE | Facility: CLINIC | Age: 69
End: 2024-08-20
Payer: MEDICARE

## 2024-08-20 VITALS
DIASTOLIC BLOOD PRESSURE: 48 MMHG | HEART RATE: 73 BPM | HEIGHT: 60 IN | SYSTOLIC BLOOD PRESSURE: 90 MMHG | OXYGEN SATURATION: 99 % | BODY MASS INDEX: 22.42 KG/M2 | WEIGHT: 114.19 LBS | TEMPERATURE: 98 F

## 2024-08-20 DIAGNOSIS — E78.5 HYPERLIPIDEMIA, UNSPECIFIED HYPERLIPIDEMIA TYPE: Primary | ICD-10-CM

## 2024-08-20 DIAGNOSIS — E03.9 HYPOTHYROIDISM, UNSPECIFIED TYPE: ICD-10-CM

## 2024-08-20 DIAGNOSIS — I10 HYPERTENSION, UNSPECIFIED TYPE: ICD-10-CM

## 2024-08-20 DIAGNOSIS — M54.50 LOW BACK PAIN, UNSPECIFIED BACK PAIN LATERALITY, UNSPECIFIED CHRONICITY, UNSPECIFIED WHETHER SCIATICA PRESENT: ICD-10-CM

## 2024-08-20 LAB
ALBUMIN SERPL BCP-MCNC: 2.6 G/DL (ref 3.5–5)
ALBUMIN/GLOB SERPL: 0.6 {RATIO}
ALP SERPL-CCNC: 123 U/L (ref 55–142)
ALT SERPL W P-5'-P-CCNC: 12 U/L (ref 13–56)
ANION GAP SERPL CALCULATED.3IONS-SCNC: 8 MMOL/L (ref 7–16)
AST SERPL W P-5'-P-CCNC: 16 U/L (ref 15–37)
BASOPHILS # BLD AUTO: 0.05 K/UL (ref 0–0.2)
BASOPHILS NFR BLD AUTO: 0.6 % (ref 0–1)
BILIRUB SERPL-MCNC: 0.3 MG/DL (ref ?–1.2)
BUN SERPL-MCNC: 14 MG/DL (ref 7–18)
BUN/CREAT SERPL: 12 (ref 6–20)
CALCIUM SERPL-MCNC: 8.8 MG/DL (ref 8.5–10.1)
CHLORIDE SERPL-SCNC: 96 MMOL/L (ref 98–107)
CHOLEST SERPL-MCNC: 189 MG/DL (ref 0–200)
CHOLEST/HDLC SERPL: 3.4 {RATIO}
CO2 SERPL-SCNC: 33 MMOL/L (ref 21–32)
CREAT SERPL-MCNC: 1.16 MG/DL (ref 0.55–1.02)
DIFFERENTIAL METHOD BLD: ABNORMAL
EGFR (NO RACE VARIABLE) (RUSH/TITUS): 52 ML/MIN/1.73M2
EOSINOPHIL # BLD AUTO: 0.08 K/UL (ref 0–0.5)
EOSINOPHIL NFR BLD AUTO: 1 % (ref 1–4)
ERYTHROCYTE [DISTWIDTH] IN BLOOD BY AUTOMATED COUNT: 14.3 % (ref 11.5–14.5)
GLOBULIN SER-MCNC: 4.4 G/DL (ref 2–4)
GLUCOSE SERPL-MCNC: 94 MG/DL (ref 74–106)
HCT VFR BLD AUTO: 39.5 % (ref 38–47)
HDLC SERPL-MCNC: 55 MG/DL (ref 40–60)
HGB BLD-MCNC: 13 G/DL (ref 12–16)
IMM GRANULOCYTES # BLD AUTO: 0.04 K/UL (ref 0–0.04)
IMM GRANULOCYTES NFR BLD: 0.5 % (ref 0–0.4)
LDLC SERPL CALC-MCNC: 102 MG/DL
LDLC/HDLC SERPL: 1.9 {RATIO}
LYMPHOCYTES # BLD AUTO: 1.14 K/UL (ref 1–4.8)
LYMPHOCYTES NFR BLD AUTO: 14.7 % (ref 27–41)
MCH RBC QN AUTO: 32.1 PG (ref 27–31)
MCHC RBC AUTO-ENTMCNC: 32.9 G/DL (ref 32–36)
MCV RBC AUTO: 97.5 FL (ref 80–96)
MONOCYTES # BLD AUTO: 1.12 K/UL (ref 0–0.8)
MONOCYTES NFR BLD AUTO: 14.5 % (ref 2–6)
MPC BLD CALC-MCNC: 9.9 FL (ref 9.4–12.4)
NEUTROPHILS # BLD AUTO: 5.32 K/UL (ref 1.8–7.7)
NEUTROPHILS NFR BLD AUTO: 68.7 % (ref 53–65)
NONHDLC SERPL-MCNC: 134 MG/DL
NRBC # BLD AUTO: 0 X10E3/UL
NRBC, AUTO (.00): 0 %
PLATELET # BLD AUTO: 472 K/UL (ref 150–400)
POTASSIUM SERPL-SCNC: 3.2 MMOL/L (ref 3.5–5.1)
PROT SERPL-MCNC: 7 G/DL (ref 6.4–8.2)
RBC # BLD AUTO: 4.05 M/UL (ref 4.2–5.4)
SODIUM SERPL-SCNC: 134 MMOL/L (ref 136–145)
T4 FREE SERPL-MCNC: 1.05 NG/DL (ref 0.76–1.46)
TRIGL SERPL-MCNC: 158 MG/DL (ref 35–150)
TSH SERPL DL<=0.005 MIU/L-ACNC: 2.19 UIU/ML (ref 0.36–3.74)
VLDLC SERPL-MCNC: 32 MG/DL
WBC # BLD AUTO: 7.75 K/UL (ref 4.5–11)

## 2024-08-20 PROCEDURE — 3008F BODY MASS INDEX DOCD: CPT | Mod: ,,, | Performed by: FAMILY MEDICINE

## 2024-08-20 PROCEDURE — 4010F ACE/ARB THERAPY RXD/TAKEN: CPT | Mod: ,,, | Performed by: FAMILY MEDICINE

## 2024-08-20 PROCEDURE — 80061 LIPID PANEL: CPT | Mod: ,,, | Performed by: CLINICAL MEDICAL LABORATORY

## 2024-08-20 PROCEDURE — 3078F DIAST BP <80 MM HG: CPT | Mod: ,,, | Performed by: FAMILY MEDICINE

## 2024-08-20 PROCEDURE — 99214 OFFICE O/P EST MOD 30 MIN: CPT | Mod: ,,, | Performed by: FAMILY MEDICINE

## 2024-08-20 PROCEDURE — 1101F PT FALLS ASSESS-DOCD LE1/YR: CPT | Mod: ,,, | Performed by: FAMILY MEDICINE

## 2024-08-20 PROCEDURE — 3074F SYST BP LT 130 MM HG: CPT | Mod: ,,, | Performed by: FAMILY MEDICINE

## 2024-08-20 PROCEDURE — 84439 ASSAY OF FREE THYROXINE: CPT | Mod: ,,, | Performed by: CLINICAL MEDICAL LABORATORY

## 2024-08-20 PROCEDURE — 3288F FALL RISK ASSESSMENT DOCD: CPT | Mod: ,,, | Performed by: FAMILY MEDICINE

## 2024-08-20 PROCEDURE — 80050 GENERAL HEALTH PANEL: CPT | Mod: ,,, | Performed by: CLINICAL MEDICAL LABORATORY

## 2024-08-20 PROCEDURE — 1159F MED LIST DOCD IN RCRD: CPT | Mod: ,,, | Performed by: FAMILY MEDICINE

## 2024-08-20 RX ORDER — PAROXETINE HYDROCHLORIDE 40 MG/1
40 TABLET, FILM COATED ORAL DAILY
Qty: 90 TABLET | Refills: 1 | Status: SHIPPED | OUTPATIENT
Start: 2024-08-20

## 2024-08-20 RX ORDER — TOPIRAMATE 200 MG/1
200 TABLET ORAL NIGHTLY
COMMUNITY
Start: 2024-08-15

## 2024-08-20 RX ORDER — ZOLPIDEM TARTRATE 10 MG/1
TABLET ORAL
Qty: 30 TABLET | Refills: 0 | Status: SHIPPED | OUTPATIENT
Start: 2024-08-20

## 2024-08-20 RX ORDER — LEVOTHYROXINE SODIUM 75 UG/1
75 TABLET ORAL
Qty: 90 TABLET | Refills: 1 | Status: SHIPPED | OUTPATIENT
Start: 2024-08-20 | End: 2025-08-20

## 2024-08-20 RX ORDER — FERROUS SULFATE 325(65) MG
325 TABLET, DELAYED RELEASE (ENTERIC COATED) ORAL 2 TIMES DAILY
Qty: 180 TABLET | Refills: 1 | Status: SHIPPED | OUTPATIENT
Start: 2024-08-20

## 2024-08-20 NOTE — PROGRESS NOTES
Vicente Guzmán MD   [facility]  [address] [place] [address]     PATIENT NAME: Anabelle Gaspar  : 1955  DATE: 24  MRN: [id number]      Billing Provider: Vicente Guzmán MD  Level of Service: [place] OFFICE/OUTPT VISIT, EST, LEVL IV, 30-39 MIN  Patient PCP Information       Provider PCP Type    Vicente Guzmán MD General            Reason for Visit / Chief Complaint: Hypertension (Check up, labs, refills ), Hypothyroidism, and Hip Pain (Chronic right hip pain. )         History of Present Illness / Problem Focused Workflow     Anabelle Gaspar presents to the clinic with Hypertension (Check up, labs, refills ), Hypothyroidism, and Hip Pain (Chronic right hip pain. )     HPI    Review of Systems     Review of Systems   Constitutional:  Negative for activity change, appetite change, fatigue and fever.   HENT:  Negative for nasal congestion, ear pain, hearing loss, sinus pressure/congestion and sore throat.    Respiratory:  Negative for cough, chest tightness and shortness of breath.    Cardiovascular:  Negative for chest pain and palpitations.   Gastrointestinal:  Negative for abdominal pain and fecal incontinence.   Genitourinary:  Negative for bladder incontinence and difficulty urinating.   Musculoskeletal:  Negative for arthralgias.   Integumentary:  Negative for rash.   Neurological:  Negative for dizziness and headaches.        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     HYSTERECTOMY      NECK SURGERY      SINUS SURGERY      STENTS      X 4       Social History  Anabelle TINAJERO Hubert  reports  that she has been smoking cigarettes. She started smoking about 52 years ago. She has a 105.3 pack-year smoking history. She has never used smokeless tobacco. She reports that she does not currently use alcohol. She reports that she does not use drugs.    Family History  Anabelle Gaspar  family history includes Cancer in her sister; Diabetes in her mother and sister; Heart disease in her mother; Hyperlipidemia in her mother and sister; Hypertension in her mother and sister.    Medications and Allergies     Medications  Outpatient Medications Marked as Taking for the 8/20/24 encounter (Office Visit) with Vicente Guzmán MD   Medication Sig Dispense Refill    amitriptyline (ELAVIL) 100 MG tablet Take 1 tablet by mouth once daily.      aspirin (ECOTRIN) 81 MG EC tablet Take 81 mg by mouth once daily.      butalbitaL-acetaminophen  mg Tab TAKE 1 OR 2 TABLETS BY MOUTH EVERY 4 HOURS AS NEEDED FOR PAIN      CALCIUM CITRATE ORAL Take 1 tablet by mouth once daily.      clopidogreL (PLAVIX) 75 mg tablet Take by mouth.      DULoxetine (CYMBALTA) 60 MG capsule Take 1 capsule by mouth 2 (two) times a day.      ENTRESTO 24-26 mg per tablet 1 tablet 2 (two) times daily.      ergocalciferol, vitamin D2, (VITAMIN D ORAL) Take 1 tablet by mouth once daily.      furosemide (LASIX) 80 MG tablet Take 80 mg by mouth 2 (two) times a day.      gabapentin (NEURONTIN) 300 MG capsule Take 300 mg by mouth 3 (three) times daily.      HYDROcodone-acetaminophen (NORCO)  mg per tablet TAKE 1 TABLET BY MOUTH 4 TIMES A DAY IF NEEDED FOR PAIN.      isosorbide mononitrate (IMDUR) 60 MG 24 hr tablet Take 60 mg by mouth 2 (two) times daily.      methocarbamoL (ROBAXIN) 750 MG Tab Take 750 mg by mouth 3 (three) times daily as needed.      metOLazone (ZAROXOLYN) 2.5 MG tablet Take 2.5 mg by mouth daily as needed. Take as needed for a weight gain of 3+      metoprolol succinate (TOPROL-XL) 50 MG 24 hr tablet Take 1 tablet by mouth once  daily.      multivitamin (THERAGRAN) per tablet Take 1 tablet by mouth once daily.      nitroGLYCERIN (NITROSTAT) 0.4 MG SL tablet Place 0.4 mg under the tongue every 5 (five) minutes as needed for Chest pain.      potassium chloride SA (K-DUR,KLOR-CON) 20 MEQ tablet Take 1 tablet by mouth 2 (two) times a day.      promethazine (PHENERGAN) 25 MG tablet TAKE 1 TABLET BY MOUTH EVERYDAY IF NEEDED      ranolazine (RANEXA) 500 MG Tb12 Take 500 mg by mouth 2 (two) times daily.      REPATHA SURECLICK 140 mg/mL PnIj Inject into the skin every 14 (fourteen) days.      topiramate (TOPAMAX) 200 MG Tab Take 200 mg by mouth every evening.      valsartan (DIOVAN) 40 MG tablet Take 40 mg by mouth 2 (two) times daily.      [DISCONTINUED] ferrous sulfate 325 (65 FE) MG EC tablet Take 1 tablet (325 mg total) by mouth 2 (two) times daily. 60 tablet 11    [DISCONTINUED] levothyroxine (SYNTHROID) 75 MCG tablet Take 1 tablet (75 mcg total) by mouth before breakfast. 30 tablet 11    [DISCONTINUED] paroxetine (PAXIL) 40 MG tablet Take 1 tablet (40 mg total) by mouth once daily. 90 tablet 1    [DISCONTINUED] zolpidem (AMBIEN) 10 mg Tab TAKE 1 TABLET AT BEDTIME IF NEEDED FOR SLEEP... 30 tablet 0       Allergies  Review of patient's allergies indicates:   Allergen Reactions    Levaquin [levofloxacin] Swelling    Dexamethasone Edema and Other (See Comments)    Prednisone Other (See Comments)       Physical Examination     Vitals:    08/20/24 1036   BP: (!) 90/48   Pulse: 73   Temp: 98.4 °F (36.9 °C)     Physical Exam  Constitutional:       General: She is not in acute distress.     Appearance: She is not ill-appearing.   HENT:      Head: Normocephalic and atraumatic.      Right Ear: Tympanic membrane and ear canal normal.      Left Ear: Tympanic membrane and ear canal normal.      Nose: Nose normal. No congestion or rhinorrhea.   Eyes:      Pupils: Pupils are equal, round, and reactive to light.   Cardiovascular:      Rate and Rhythm: Normal  rate and regular rhythm.      Pulses: Normal pulses.      Heart sounds: No murmur heard.  Pulmonary:      Effort: No respiratory distress.      Breath sounds: No wheezing, rhonchi or rales.   Abdominal:      General: Bowel sounds are normal.      Palpations: Abdomen is soft.      Tenderness: There is no abdominal tenderness.      Hernia: No hernia is present.   Musculoskeletal:         General: Tenderness (bilateral hips) present.      Cervical back: Normal range of motion and neck supple.   Lymphadenopathy:      Cervical: No cervical adenopathy.   Skin:     General: Skin is warm and dry.   Neurological:      Mental Status: She is alert.   Psychiatric:         Behavior: Behavior normal.         Thought Content: Thought content normal.          Assessment and Plan (including Health Maintenance)   :    Plan:         Health Maintenance Due   Topic Date Due    Hepatitis C Screening  Never done    Pneumococcal Vaccines (Age 65+) (1 of 2 - PCV) Never done    TETANUS VACCINE  Never done    DEXA Scan  Never done    Shingles Vaccine (1 of 2) Never done    RSV Vaccine (Age 60+ and Pregnant patients) (1 - 1-dose 60+ series) Never done    Pap Smear  01/29/2017    COVID-19 Vaccine (3 - 2023-24 season) 09/01/2023    LDCT Lung Screen  12/21/2023    Colorectal Cancer Screening  07/03/2024    Mammogram  09/28/2024       Problem List Items Addressed This Visit          Cardiac/Vascular    Hyperlipidemia - Primary    Relevant Orders    CBC Auto Differential (Completed)    Comprehensive Metabolic Panel (Completed)    Lipid Panel (Completed)    Hypertension    Relevant Orders    CBC Auto Differential (Completed)    Comprehensive Metabolic Panel (Completed)       Endocrine    Hypothyroidism    Relevant Orders    T4, Free (Completed)    TSH (Completed)     Other Visit Diagnoses       Low back pain, unspecified back pain laterality, unspecified chronicity, unspecified whether sciatica present              Hyperlipidemia, unspecified  hyperlipidemia type  -     CBC Auto Differential; Future; Expected date: 08/20/2024  -     Comprehensive Metabolic Panel; Future; Expected date: 08/20/2024  -     Lipid Panel; Future; Expected date: 08/20/2024    Hypertension, unspecified type  -     CBC Auto Differential; Future; Expected date: 08/20/2024  -     Comprehensive Metabolic Panel; Future; Expected date: 08/20/2024    Hypothyroidism, unspecified type  -     T4, Free; Future; Expected date: 08/20/2024  -     TSH; Future; Expected date: 08/20/2024    Low back pain, unspecified back pain laterality, unspecified chronicity, unspecified whether sciatica present    Other orders  -     ferrous sulfate 325 (65 FE) MG EC tablet; Take 1 tablet (325 mg total) by mouth 2 (two) times daily.  Dispense: 180 tablet; Refill: 1  -     levothyroxine (SYNTHROID) 75 MCG tablet; Take 1 tablet (75 mcg total) by mouth before breakfast.  Dispense: 90 tablet; Refill: 1  -     paroxetine (PAXIL) 40 MG tablet; Take 1 tablet (40 mg total) by mouth once daily.  Dispense: 90 tablet; Refill: 1  -     zolpidem (AMBIEN) 10 mg Tab; TAKE 1 TABLET AT BEDTIME IF NEEDED FOR SLEEP...  Dispense: 30 tablet; Refill: 0       Health Maintenance Topics with due status: Not Due       Topic Last Completion Date    Influenza Vaccine 10/29/2019    Aspirin/Antiplatelet Therapy 08/20/2024    Lipid Panel 08/20/2024       Procedures     Future Appointments   Date Time Provider Department Center   8/27/2024 10:15 AM Shelley Alexis FNP [facility]   [facility]  [facility]        No follow-ups on file.       Signature:  Vicente Guzmán MD  [facility]  [address] [facility]   [place] [address]  [phone] Phone  [phone] Fax    Date of encounter: 8/20/24

## 2024-08-26 ENCOUNTER — PATIENT OUTREACH (OUTPATIENT)
Facility: HOSPITAL | Age: 69
End: 2024-08-26
Payer: MEDICARE

## 2024-08-26 NOTE — LETTER
AUTHORIZATION FOR RELEASE OF   CONFIDENTIAL INFORMATION    Dear [facility] medical Records,    We are seeing Anabelle Gaspar, date of birth 1955, in the clinic at [facility]. Vicente Guzmán MD is the patient's PCP. Anabelle Gaspar has an outstanding lab/procedure at the time we reviewed her chart. In order to help keep her health information updated, she has authorized us to request the following medical record(s):                  ( X )  Last OV and any history statin therapy         Please fax records to Jazmyne Argueta LPN, Care Coordinator at [phone].      If you have any questions, please contact Jazmyne at [phone]            Patient Name: Anabelle aGspar  : 1955  Patient Phone #: [phone]           Anabelle Gaspar  MRN: [id number]  : 1955  Age: 68 y.o.  Sex: female         Patient/Legal Guardian Signature  This signature was collected at 2024           _______________________________   Printed Name/Relationship to Patient      Consent for Examination and Treatment: I hereby authorize the providers and employees of [facility] ([facility]) to provide medical treatment/services which includes, but is not limited to, performing and administering tests and diagnostic procedures that are deemed necessary, including, but not limited to, imaging examinations, blood tests and other laboratory procedures as may be required by the hospital, clinic, or may be ordered by my physician(s) or persons working under the general and/or special instructions of my physician(s).      I understand and agree that this consent covers all authorized persons, including but not limited to physicians, residents, nurse practitioners, physicians' assistants, specialists, consultants, student nurses, and independently contracted physicians, who are called upon by the physician in charge, to carry out the diagnostic procedures and medical or surgical treatment.     I hereby authorize [facility]  to retain or dispose of any specimens or tissue, should there be such remaining from any test or procedure.     I hereby authorize and give consent for [facility] providers and employees to take photographs, images or videotapes of such diagnostic, surgical or treatment procedures of Patient as may be required by [facility] or as may be ordered by a physician. I further acknowledge and agree that [facility] may use cameras or other devices for patient monitoring.     I am aware that the practice of medicine is not an exact science, and I acknowledge that no guarantees have been made to me as to the outcome of any tests, procedures or treatment.     Authorization for Release of Information: I understand that my insurance company and/or their agents may need information necessary to make determinations about payment/reimbursement. I hereby provide authorization to release to all insurance companies, their successors, assignees, other parties with whom they may have contracted, or others acting on their behalf, that are involved with payment for any hospital and/or clinic charges incurred by the patient, any information that they request and deem necessary for payment/reimbursement, and/or quality review.  I further authorize the release of my health information to physicians or other health care practitioners on staff who are involved in my health care now and in the future, and to other health care providers, entities, or institutions for the purpose of my continued care and treatment, including referrals.     REGISTRATION AUTHORIZATION  Form No. 11657 (Rev. 3/25/2024)    Page 1 of 3                       Medicare Patient's Certification and Authorization to Release Information and Payment Request:  I certify that the information given by me in applying for payment under Title XVIII of the Social Security Act is correct. I authorize any castrejon of medical or other information about me to release to the [facility], or its intermediaries or carriers, any information needed for this or a related Medicare claim. I request that payment of authorized benefits be made on my behalf.     Assignment of Insurance Benefits:   I hereby authorize any and all insurance companies, health plans, defined   benefit plans, health insurers or any entity that is or may be responsible for payment of my medical expenses to pay all hospital and medical benefits now due, and to become due and payable to me under any hospital benefits, sick benefits, injury benefits or any other benefit for services rendered to me, including Major Medical Benefits, direct to [facility] and all independently contracted physicians. I assign any and all rights that I may have against any and all insurance companies, health plans, defined benefit plans, health insurers or any entity that is or may be responsible for payment of my medical expenses, including, but not limited to any right to appeal a denial of a claim, any right to bring any action, lawsuit, administrative proceeding, or other cause of action on my behalf. I specifically assign my right to pursue litigation against any and all insurance companies, health plans, defined benefit plans, health insurers or any entity that is or may be responsible for payment of my medical expenses based upon a refusal to pay charges.            E. Valuables: It is understood and agreed that [facility] is not liable for the damage to or loss of any money, jewelry,   documents, dentures, eye glasses, hearing aids, prosthetics, or other property of value.     F. Computer Equipment: I understand and agree that should I choose to use computer equipment owned by [facility] or if I choose to access the Internet via [facility]s network, I do so at my own risk. [facility] is not responsible for any damage to my computer equipment or to any damages of any type that might arise from my loss of equipment or data.     G. Acceptance  of Financial Responsibility:  I agree that in consideration of the services and   supplies that have been   or will be furnished to the patient, I am hereby obligated to pay all charges made for or on the account of the patient according to the standard rates (in effect at the time the services and supplies are delivered) established by [facility], including its Patient Financial Assistance Policy to the extent it is applicable. I understand that I am responsible for all charges, or portions thereof, not covered by insurance or other sources. Patient refunds will be distributed only after balances at all [facility] facilities are paid.     H. Communication Authorization:  I hereby authorize [facility] and its representatives, along with any billing service   or  who may work on their behalf, to contact me on   my cell phone and/or home phone using pre- recorded messages, artificial voice messages, automatic telephone dialing devices or other computer assisted technology, or by electronic      mail, text messaging, or by any other form of electronic communication. This includes, but is not limited to, appointment reminders, yearly physical exam reminders, preventive care reminders, patient campaigns, welcome calls, and calls about account balances on my account or any account on which I am listed as a guarantor. I understand I have the right to opt out of these communications at any time.      Relationship  Between  Facility and  Provider:      I understand that some, but not all, providers furnishing services to the patient are not employees or agents of [facility]. The patient is under the care and supervision of his/her attending physician, and it is the responsibility of the facility and its nursing staff to carry out the instructions of such physicians. It is the responsibility of the patient's physician/designee to obtain the patient's informed consent, when required, for medical or surgical treatment,  special diagnostic or therapeutic procedures, or hospital services rendered for the patient under the special instructions of the physician/designee.           REGISTRATION AUTHORIZATION  Form No. 22893 (Rev. 3/25/2024)    Page 2 of 3                       Immunizations: [facility] shares immunization information with state sponsored health departments to help you and your doctor keep track of your immunization records. By signing, you consent to have this information shared with the health department in your state:                                [place] - LINKS ([place] Immunization Network for Kids Statewide)                                [place] - MIIX ([place] Immunization Information eXchange)                                [place] - ImmPRINT (Immunization Patient Registry with Integrated Technology)     TERM: This authorization is valid for this and subsequent care/treatment I receive at [facility] and will remain valid unless/until revoked in writing by me.     [facility]: As used in this document, [facility] means all [facility] owned and managed facilities, including, but not limited to, all health centers, surgery centers, clinics, urgent care centers, and hospitals.         [facility] complies with applicable Federal civil rights laws and does not discriminate on the basis of race, color, national origin, age, disability, or sex.  ATENCIÓN: si habla español, tiene a proctor disposición servicios gratuitos de asistencia lingüística. Rosalia benavides [phone].  CHÚ Ý: N?u b?n nói Ti?ng Vi?t, có các d?ch v? h? tr? ngôn ng? mi?n phí dành cho b?n. G?i s? [phone].        REGISTRATION AUTHORIZATION  Form No. 88164 (Rev. 3/25/2024)   Page 3 of 3

## 2024-08-26 NOTE — PROGRESS NOTES
Population Health Chart Review & Patient Outreach Details    Updates Requested / Reviewed:  [x]  Care Team Updated  []  Care Everywhere Updated & Reviewed  []  [facility] Reviewed  []   Reviewed  []  MIIX Reviewed    Health Maintenance Topics Addressed and Outreach Outcomes / Actions Taken:  Medication Management  [x] UDAY sent to cardiologist for any statin mediation history

## 2024-10-08 RX ORDER — ZOLPIDEM TARTRATE 10 MG/1
TABLET ORAL
Qty: 30 TABLET | Refills: 0 | Status: SHIPPED | OUTPATIENT
Start: 2024-10-08

## 2024-10-15 DIAGNOSIS — Z96.641 S/P HIP REPLACEMENT, RIGHT: Primary | ICD-10-CM
